# Patient Record
Sex: FEMALE | Race: BLACK OR AFRICAN AMERICAN | Employment: OTHER | URBAN - METROPOLITAN AREA
[De-identification: names, ages, dates, MRNs, and addresses within clinical notes are randomized per-mention and may not be internally consistent; named-entity substitution may affect disease eponyms.]

---

## 2017-11-18 ENCOUNTER — HOSPITAL ENCOUNTER (OUTPATIENT)
Dept: GENERAL RADIOLOGY | Age: 82
Discharge: HOME | End: 2017-11-18
Attending: PAIN MEDICINE
Payer: MEDICARE

## 2017-11-18 ENCOUNTER — HOSPITAL ENCOUNTER (OUTPATIENT)
Dept: MRI IMAGING | Age: 82
Discharge: HOME | End: 2017-11-18
Attending: PAIN MEDICINE
Payer: MEDICARE

## 2017-11-18 VITALS — BODY MASS INDEX: 24.22 KG/M2 | WEIGHT: 124 LBS

## 2017-11-18 DIAGNOSIS — M54.16 LUMBAR RADICULOPATHY: ICD-10-CM

## 2017-11-18 DIAGNOSIS — M48.07 LUMBOSACRAL STENOSIS: ICD-10-CM

## 2017-11-18 DIAGNOSIS — M46.1 SACROILIITIS, NOT ELSEWHERE CLASSIFIED (HCC): ICD-10-CM

## 2017-11-18 DIAGNOSIS — M47.817 LUMBOSACRAL SPONDYLOSIS WITHOUT MYELOPATHY: ICD-10-CM

## 2017-11-18 DIAGNOSIS — M96.1 POSTLAMINECTOMY SYNDROME, CERVICAL REGION: ICD-10-CM

## 2017-11-18 DIAGNOSIS — M54.17 LUMBOSACRAL NEURITIS: ICD-10-CM

## 2017-11-18 LAB — CREAT BLD-MCNC: 0.8 MG/DL (ref 0.6–1.3)

## 2017-11-18 PROCEDURE — A9576 INJ PROHANCE MULTIPACK: HCPCS | Performed by: PAIN MEDICINE

## 2017-11-18 PROCEDURE — 74011250636 HC RX REV CODE- 250/636: Performed by: PAIN MEDICINE

## 2017-11-18 PROCEDURE — 82565 ASSAY OF CREATININE: CPT

## 2017-11-18 PROCEDURE — 72114 X-RAY EXAM L-S SPINE BENDING: CPT

## 2017-11-18 PROCEDURE — 72158 MRI LUMBAR SPINE W/O & W/DYE: CPT

## 2017-11-18 RX ORDER — SODIUM CHLORIDE 0.9 % (FLUSH) 0.9 %
10 SYRINGE (ML) INJECTION
Status: COMPLETED | OUTPATIENT
Start: 2017-11-18 | End: 2017-11-18

## 2017-11-18 RX ADMIN — Medication 10 ML: at 15:40

## 2017-11-18 RX ADMIN — GADOTERIDOL 10 ML: 279.3 INJECTION, SOLUTION INTRAVENOUS at 15:39

## 2021-11-03 ENCOUNTER — TELEPHONE (OUTPATIENT)
Dept: PALLATIVE CARE | Age: 86
End: 2021-11-03

## 2021-11-03 NOTE — TELEPHONE ENCOUNTER
Pierre Vincent is calling to refer patient to Palliative , states referred by Dr. Martina lozoya/ Ramses Estevez. Referred reason:  Frequent falls, old age. Advised to call Isaac Rivers, daughter at [phone].

## 2021-11-03 NOTE — TELEPHONE ENCOUNTER
Home Based Primary Care & Supportive Services   Phone:  [phone]      Fax:  [phone] [facility] [address]    Name:  Ton Tsai  YOB: 1922    Received outpatient Palliative Medicine referral from Kenna carl at [facility] for Ton Tsai. Referral reason stated:  Frequent falls, old age. Contact is patient's daughter Delta Stuart  [phone]. This nurse called Delta Stuart, no answer, left message. This nurse called Kenna carl as Maria Eugenia Harley and explained that  patient's daughter calls back this nurse will assess for the patient's functional decline and other symptoms. The guidelines for our outpatient Specialty Palliative Medicine clinic are:  Patient is in the latter stages of a serious progressive illness, and in addition to limited life expectancy, the patient has the presence of 1 or more COPE needs (Care Decisions, Overwhelming Symptoms, Psychosocial Distress, or End-Stage Disease). This nurse also informed Kenna carl  that one of the two outpatient Palliative physicians has been on leave for 3 months. She returns in two weeks. For now, we are only accepting active cancer patients for outpatient Palliative appts. That will change sometime in the near future, but his nurse does not have a date yet. Addendum:  Patient's Delta Stuart returned call on 11/5/21. She is looking for Home Based Primary Care for her mother (not Palliative). See this nurse's note from 11/5/21.     Justen Ness, JUANN, RN-BC, [facility]  Referral Navigator, Home Based Primary Care & Supportive Services

## 2021-11-05 ENCOUNTER — TELEPHONE (OUTPATIENT)
Dept: FAMILY MEDICINE CLINIC | Age: 86
End: 2021-11-05

## 2021-11-05 NOTE — TELEPHONE ENCOUNTER
[address] [place] [address], [address] [facility] [address]  Phone:  [phone]        Fax:  [phone]    Patient:  Frederick Celestin  YOB: 1922    Incoming call from patient's daughter Micki Daly. She says Deanna Carrington recommended [facility] to her for her mother Frederick Celestin. Preliminary Intake Note:     Address:   [place] [address]    Does patient live within 10 miles of [address] [place] [address] at address listed above?      yes         Distance from [address] [place] [address]/Travel Time:  4.7 miles/ 14 minutes    Insurance Information:  [facility] Medicare Complete    Does patient qualify based on address and insurance? This nurse will call and check insurance      Region:  [place], near [address] [place] [address].    Referred By: Deanna Carrington    Reason for Referral:  Patient had appt with Deanna Carrington physician on 11/2/21. It was a taxing effort for daughter to get patient out to the appt. Diagnosis:  HTN, osteoarthritis, hx of breast cancer, GERD, anxiety    Last PCP visit:  11/2/21    Last Hospitalization:  None recent per daughter    Nursing Home/Rehab stay in the past year?  no    Homebound Assessment:  Tell me about the last time you left home, where did you go? To PCP office on 11/2/21  How long were you gone? About 2 hours  How did you get there?  vehicle  Who went with you?  daughter and her friend  How did you tolerate the outing? Not tolerated well, patient was weak and tired after the trip  How does your illness or condition impact your ability to leave home? Frail, frequent falls    Primary Caregiver:  Micki Daly  Relation to Patient:  daughter  Contact Information:  [phone]    Records Needed:  Yes, will need to request records from Deanna Carrington     Current Controlled Substances:  No    Next steps: This nurse will call and check insurance.                          If Dr. Mike Sullivan is in-network with patient's plan, will send Authorization to Release Health Information form to patient for signature so records can be requested from [facility].

## 2021-11-09 ENCOUNTER — TELEPHONE (OUTPATIENT)
Dept: FAMILY MEDICINE CLINIC | Age: 86
End: 2021-11-09

## 2021-11-09 NOTE — TELEPHONE ENCOUNTER
Called Rubio Lilly, patients daughter, to get updated insurance information for the patient. Left a VM at the home phone for Khang Espinosa to please call back the [address] [place] [address] office. Tried to call cell phone but not available for service.

## 2021-11-10 NOTE — TELEPHONE ENCOUNTER
Patient's daughter Ventura Dorantes returned call and gave this nurse insurance information-sj now has [employer]. PSR will check insurance authorization tomorrow.

## 2021-11-11 ENCOUNTER — TELEPHONE (OUTPATIENT)
Dept: FAMILY MEDICINE CLINIC | Age: 86
End: 2021-11-11

## 2021-11-11 NOTE — TELEPHONE ENCOUNTER
I called Yaneth Pak to verify the patients policy information. I spoke with Florence Pollock.  Of [facility] and he said that the patient is:    Fully funded  No Pior Auth needed  Must call member line to change PCP prior to 1st visit  Dr. Song Contreras is in network  $0 co-pay, $25 Specialist  $3450 limit out of pocket/$175 met  Codes approved: 42811,68376, 12506,90575  Call Reference #[id number]

## 2021-11-12 ENCOUNTER — TELEPHONE (OUTPATIENT)
Dept: FAMILY MEDICINE CLINIC | Age: 86
End: 2021-11-12

## 2021-11-12 NOTE — TELEPHONE ENCOUNTER
Martín Hull called to find out about the status of the patients acceptance into the [facility] program.  She can also be reached at [phone]

## 2021-11-15 NOTE — TELEPHONE ENCOUNTER
Home Based Primary Care & Supportive Services   Phone:  [phone]      Fax:  [phone] [address]    Name:  Nida Pena  YOB: 1922    Patient's request for [facility] services was discussed in weekly IDG on 11/11/21 and patient was accepted pending one clarification: [facility] note from 11/2/21 says patient's daughter would like patient to go to Assisted Living. This nurse called patient's daughter on 11/12/21 and left message requesting call back to clarify. Daughter Flaco Mosher returned call. She states that she would like to keep her mother at home and she would like [facility] services. At this time, Assisted Living would be a plan in the future if her mother's condition declines further. This nurse will inform [facility] team that patient is ready for scheduling.     JUAN WhiteN, RN-BC, [facility]  Referral Navigator, Home Based Primary Care & Supportive Services

## 2021-11-16 ENCOUNTER — TELEPHONE (OUTPATIENT)
Dept: FAMILY MEDICINE CLINIC | Age: 86
End: 2021-11-16

## 2021-11-16 NOTE — TELEPHONE ENCOUNTER
LCSW rec'd return call from pt's dtr, Roderick Jurado. She stated she is not available on Monday 11/22 for intake paperwork completion visit, is requesting Friday 11/19. LCSW to visit with pt and Roderick Jurado in their home on Friday 11/19 @ 4pm for initial psychosocial assessment and intake paperwork completion.     Oksana Ro, MEHRDAD, [facility]    [place] Based [address] [place] [address]  J) [phone]

## 2021-11-16 NOTE — TELEPHONE ENCOUNTER
LCSW called pt's dtr, Trevor, to coordinate a time on Monday 11/22 that LCSW can come by the home with [facility] intake paperwork for completion prior to Dr. Sonny Olea visit on Tuesday 11/23 for initial provider visit. No answer. LCSW left detailed message, provided contact information, and encouraged a call back at her convenience.      MEHRDAD Haas, [employer]    [place] Based [address] [place] [address]  (c) [phone]

## 2021-11-19 ENCOUNTER — HOME VISIT (OUTPATIENT)
Dept: FAMILY MEDICINE CLINIC | Age: 86
End: 2021-11-19

## 2021-11-20 NOTE — PROGRESS NOTES
Home Based Primary Care   [phone]  Initial Social Work Assessment    Date and Time of Initial In-Home Visit: 11/19/21    Reason for Assessment: [facility] intake paperwork completion    Sources of Information: Pt's dtr, Radha Peralta    SOCIAL HISTORY  Relationship Status:   [] Single  []   [] Significant Other  []   [x] /  [] Other:     Living Circumstances:  Pt lives at home with her daughter, Manuel Mae    Current/Type of Housing:  [] Public/subsidized housing  [] Apartment, Is there an elevator:   [] Assisted Living  [x] [employer], How many floors: 2    FINANCIAL/INSURANCE  Income per month: $1,100/month  Source of Income:    [x] Social Security: $1,000/month   [] SSI   [x] Pension: $100/month   [] Employment Income   [] [address]:   [x] Medicare   [] Medicaid   [x] Private Insurance: [employer]   [] Other:     Are you having trouble paying for things like rent, food, medications? Not at this time    Is there an agency or person who pays your bills? If yes, who? Dtr, Manuel Mae, manages most household and personal tasks for pt    [address]: No concerns    Problem with bed bugs, odors, pests, or pets? Not at this time    Working smoke alarm? [x] Yes [] No    Difficulty getting to exits from the home? There are steps to the front door, a ground-level exit at the side of the home    Firearm Assessment:   Are there firearms in the home? [] Yes, Where are they kept? [x] No      SOCIAL SUPPORT ENVIRONMENT  Support System: Pt's dtr, Manuel Mae, is primary support. Manuel Mae is an only child. Pt has siblings. Closest sibling lives off [facility], is currently on hospice    How often do social supports visit? Pt lives with Manuel Mae in their home that they share together    How do you socialize? When people come to visit    Are you involved with any community agencies? Name/Contact: Bob Almendarez was pt's former primary care.  She had a liaison visit from [address] still keeps in touch with their , Rayshawn Ghotra. LCSW spoke with Rayshawn Ghotra on the phone while at the home on Friday. Is or has Adult Protective Services ever been involved? No    In the last 6 months, have you seen a ? Psychiatrist? If yes, they be contacted by [facility] team? Yes, Rayshawn [facility]hotra at [facility]    Substance Use:   Tobacco? [] Yes [x] No    Alcohol? [] Yes, How many drinks per week: [x] No   Drugs? [] Yes, which drugs:   [x] No    Do you have an Emergency Call Device system? [] Yes, Which brand? [x] No, Are you interested in obtaining and subscribing to an Emergency Call Device system? Keith Riggins states she is considering looking in to seeing if [employer] will cover an emergency call device. Minnieroxanne Riggins has her own necklace button that she said was covered by her own insurance. She states that mom is rarely left home alone. COGNITIVE/EMOTIONAL   Mental Status: Pt was awake, ambulating using walker in the back half of the house. She had been sleeping prior to LCSW visit. Dtr, Keith Riggins, states that mom's memory is mediocre, she has some difficulty remembering things or understanding what is being said at times. LCSW spoke with pt briefly. She was able to answer questions appropriately, but did not appear to wish to engage in longer conversation. She was comfortable with dtr, Keith Riggins, signing intake paperwork on her behalf. Keith Riggins stated she did not believe that mom would completely understand the documents she was signing. How is your memory? Dtr states there are memory concerns    In the last 6 months, has anyone told you that you are forgetful? Yes    Do you receive help with ADLs? [x] Yes, Who helps you? How many hours/days? Dtr, Keith Riggins, is primary caregiver. Ketih Riggins has many health concerns of her own. LCSW assessed for ability to hire an aide. Keith Riggins stated that they had tried this in the past, and mom refused to let them come back.  Mom is a \"very private\" person and is not comfortable with anyone except Jim Arnold providing personal care. [] No, Do you need help? TRANSPORTATION NEEDS ASSESSMENT  Can you use public transportation? [] Yes [x] No  Do you use transportation services provided by: Managed Care Organization? Community Service Resource? No    EMERGENCY ACTION PLAN  SW reviewed the Emergency Action Plan with pt and/or family during this initial in-home Social Work assessment. SW completed Emergency Numbers, reviewed the content areas of Power Loss, Natural Disasters, Clinical Emergencies, Severe Weather, Safety in the Home, and Infection Control. Patient's acuity value consider to be LOW. [address] stated that mom has a completed Power of  document \"somwhere in the house\". The house was fairly cluttered, and Jim Clayton said she would have to search for it. LCSW encouraged her to try to find the Power of  documents, and provide a copy to the [facility] team at a later date. Narrative:  SW visited with pt and dtr, Jim Arnold, in the home. New patient assessment of psychosocial needs and social determinants of health completed. SW introduced Home Based Primary Care and Supportive Services, and reviewed Emergency Action Plan booklet. Pt was initially asleep in the back bedroom of the home when LCSW arrived. Dtr. Jim Arnold, came to the door and it was apparent that she has health concerns of her own. She disclosed that she cannot drive due to having seizures and history of strokes. If transportation is needed, Jim Arnold calls her cousin who lives nearby and helps take Jim Arnold and pt to appointments. Pt has been going to [facility] for her primary care. Jim Arnold expressed dissatisfaction with [facility] and is looking forward to enrolling mom in [facility]. Jim Arnold stated that mom had an intake assessment from [facility] recently, but pt and dtr were informed that mom did not meet hospice criteria at that time.  Jim Arnold has been in touch with their , Brendan Claude ([phone]). Conrad Raya was initially confused as to what [facility] was, thinking that [facility] was hospice. LCSW provided education. She requested that LCSW talk with Brendan Claude. She called Brendan Claude on her cell phone. LCSW talked to Brendan Claude, explained what services [facility] provides. Brendan Claude stated that she thinks pt will benefit from [facility] considerably, and that it was the best option until pt meets hospice eligibility criteria. Mirza Claude requested LCSW's contact information, so she can learn more about [facility]. LCSW provided contact information. LCSW assessed caregiver concerns. Conrad Raya is pt's only caregiver, but Conrad Raya appears frail and has health concerns of her own. LCSW asked about feasibility of hiring an aide for an out of pocket cost, as pt does not have Medicaid. Conrad Raya stated that they have tried this in the past (hiring private duty aides), but mom ends up refusing to allow them to come back because she is \"very private\" and is not comfortable with anyone except Conrad Aquinos providing personal care. LCSW asked Conrad Raya if they have ever applied for Medicaid. She states that mom's monthly income is roughly $1,100/month. LCSW explained that this pay put her in the eligibility criteria range for Medicaid. LCSW provided Conrad Raya with the phone number to call to apply for Medicaid [phone] since she does not have a computer or ability to apply online. She said she will call to apply either later this evening (the intake line is open until 7pm) or tomorrow since they have Saturday hours. She expressed appreciation for the information today. In the meantime, [facility] provided a list of local home care agencies that could provide personal care aides, if the responsibility of caring for mom becomes a danger to Conrad Raya due to her own health concerns.     Plan:   [x] Establish therapeutic relationship through in home visits and telephonic touch points  [] Assist in optimizing levels of psychosocial function  [x] Assess safety concerns and address as appropriate  [x] Assess for caregiver burden and intervene as psychosocially indicated  [x] Assess patient for caregiver needs to optimize psychosocial function and safety  [x] Provide information on available community resources  [] Initiate conversation regarding health care wishes   [] Assess current Advance Care Planning documents and make ACP recommendations as appropriate  [] Discuss goals of care and treatment preferences  [] Screen for mental health conditions including but not limited to anxiety, depression, and anticipatory grief  [] Offer counseling services for mental health conditions including but not limited to anxiety, depression, and anticipatory grief  [] Engage family in patient health care goals to ensure support for those goals and minimize patient/family conflict  [] Assess cultural needs of patient/family, educate interdisciplinary team and engage appropriate resources    Frequency of Social Work Follow-Up/Visits  [] As needed  [] Bi-monthly  [x] Monthly  [] Weekly  [] Other:    MEHRDAD Brenner, [address] Worker  Home Based [address] [place] [address]  [phone]

## 2021-11-20 NOTE — ACP (ADVANCE CARE PLANNING)
Pt's dtr, Oswald Peters, stated that mom has medical Power of [employer] documents, but they were not accessible during LCSW's initial in-home assessment. LCSW encouraged her to find the document and provide to [facility] team, to be scanned in to mom's medical record.

## 2021-11-23 ENCOUNTER — HOME VISIT (OUTPATIENT)
Dept: FAMILY MEDICINE CLINIC | Age: 86
End: 2021-11-23
Payer: MEDICARE

## 2021-11-23 DIAGNOSIS — I27.20 PULMONARY HYPERTENSION (HCC): ICD-10-CM

## 2021-11-23 DIAGNOSIS — M19.90 OSTEOARTHRITIS, UNSPECIFIED OSTEOARTHRITIS TYPE, UNSPECIFIED SITE: ICD-10-CM

## 2021-11-23 DIAGNOSIS — Z87.898 HISTORY OF BENZODIAZEPINE USE: ICD-10-CM

## 2021-11-23 DIAGNOSIS — Z74.09 IMPAIRED MOBILITY: ICD-10-CM

## 2021-11-23 DIAGNOSIS — J44.9 CHRONIC OBSTRUCTIVE PULMONARY DISEASE, UNSPECIFIED COPD TYPE (HCC): ICD-10-CM

## 2021-11-23 DIAGNOSIS — Z91.81 AT HIGH RISK FOR FALLS: ICD-10-CM

## 2021-11-23 DIAGNOSIS — I10 ESSENTIAL HYPERTENSION: Primary | ICD-10-CM

## 2021-11-23 DIAGNOSIS — F32.5 MAJOR DEPRESSIVE DISORDER WITH SINGLE EPISODE, IN REMISSION (HCC): ICD-10-CM

## 2021-11-23 DIAGNOSIS — I50.32 CHRONIC HEART FAILURE WITH PRESERVED EJECTION FRACTION (HCC): ICD-10-CM

## 2021-11-23 DIAGNOSIS — Z76.89 ESTABLISHING CARE WITH NEW DOCTOR, ENCOUNTER FOR: ICD-10-CM

## 2021-11-23 PROCEDURE — 1101F PT FALLS ASSESS-DOCD LE1/YR: CPT | Performed by: FAMILY MEDICINE

## 2021-11-23 PROCEDURE — 1090F PRES/ABSN URINE INCON ASSESS: CPT | Performed by: FAMILY MEDICINE

## 2021-11-23 PROCEDURE — G8536 NO DOC ELDER MAL SCRN: HCPCS | Performed by: FAMILY MEDICINE

## 2021-11-23 PROCEDURE — G8427 DOCREV CUR MEDS BY ELIG CLIN: HCPCS | Performed by: FAMILY MEDICINE

## 2021-11-23 PROCEDURE — 99343 PR HOME VST NEW PATIENT MOD-HI SEVERITY 45 MINUTES: CPT | Performed by: FAMILY MEDICINE

## 2021-11-26 ENCOUNTER — APPOINTMENT (OUTPATIENT)
Dept: GENERAL RADIOLOGY | Age: 86
End: 2021-11-26
Attending: EMERGENCY MEDICINE
Payer: MEDICARE

## 2021-11-26 ENCOUNTER — APPOINTMENT (OUTPATIENT)
Dept: CT IMAGING | Age: 86
End: 2021-11-26
Attending: EMERGENCY MEDICINE
Payer: MEDICARE

## 2021-11-26 ENCOUNTER — HOSPITAL ENCOUNTER (EMERGENCY)
Age: 86
Discharge: HOME | End: 2021-11-27
Attending: EMERGENCY MEDICINE
Payer: MEDICARE
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
## 2021-11-26 PROCEDURE — 70450 CT HEAD/BRAIN W/O DYE: CPT

## 2021-11-26 PROCEDURE — 71045 X-RAY EXAM CHEST 1 VIEW: CPT

## 2021-11-26 PROCEDURE — 99285 EMERGENCY DEPT VISIT HI MDM: CPT

## 2021-11-26 PROCEDURE — 72125 CT NECK SPINE W/O DYE: CPT

## 2021-11-27 VITALS
HEIGHT: 64 IN | SYSTOLIC BLOOD PRESSURE: 112 MMHG | HEART RATE: 76 BPM | RESPIRATION RATE: 16 BRPM | WEIGHT: 98 LBS | DIASTOLIC BLOOD PRESSURE: 65 MMHG | OXYGEN SATURATION: 97 % | TEMPERATURE: 97.6 F | BODY MASS INDEX: 16.73 KG/M2

## 2021-11-27 LAB
ALBUMIN SERPL-MCNC: 3 G/DL (ref 3.5–5)
ALBUMIN/GLOB SERPL: 0.6 {RATIO} (ref 1.1–2.2)
ALP SERPL-CCNC: 92 U/L (ref 45–117)
ALT SERPL-CCNC: 13 U/L (ref 12–78)
ANION GAP SERPL CALC-SCNC: 7 MMOL/L (ref 5–15)
APPEARANCE UR: CLEAR
AST SERPL-CCNC: 22 U/L (ref 15–37)
ATRIAL RATE: 315 BPM
BACTERIA URNS QL MICRO: NEGATIVE /HPF
BASOPHILS # BLD: 0.1 K/UL (ref 0–0.1)
BASOPHILS NFR BLD: 1 % (ref 0–1)
BILIRUB SERPL-MCNC: 0.6 MG/DL (ref 0.2–1)
BILIRUB UR QL: NEGATIVE
BUN SERPL-MCNC: 20 MG/DL (ref 6–20)
BUN/CREAT SERPL: 24 (ref 12–20)
CALCIUM SERPL-MCNC: 9.6 MG/DL (ref 8.5–10.1)
CALCULATED P AXIS, ECG09: 18 DEGREES
CALCULATED R AXIS, ECG10: 7 DEGREES
CALCULATED T AXIS, ECG11: 47 DEGREES
CHLORIDE SERPL-SCNC: 102 MMOL/L (ref 97–108)
CO2 SERPL-SCNC: 26 MMOL/L (ref 21–32)
COLOR UR: NORMAL
COMMENT, HOLDF: NORMAL
CREAT SERPL-MCNC: 0.83 MG/DL (ref 0.55–1.02)
DIAGNOSIS, 93000: NORMAL
DIFFERENTIAL METHOD BLD: ABNORMAL
EOSINOPHIL # BLD: 0 K/UL (ref 0–0.4)
EOSINOPHIL NFR BLD: 0 % (ref 0–7)
EPITH CASTS URNS QL MICRO: NORMAL /LPF
ERYTHROCYTE [DISTWIDTH] IN BLOOD BY AUTOMATED COUNT: 12.7 % (ref 11.5–14.5)
GLOBULIN SER CALC-MCNC: 5 G/DL (ref 2–4)
GLUCOSE SERPL-MCNC: 124 MG/DL (ref 65–100)
GLUCOSE UR STRIP.AUTO-MCNC: NEGATIVE MG/DL
HCT VFR BLD AUTO: 36.3 % (ref 35–47)
HGB BLD-MCNC: 11.6 G/DL (ref 11.5–16)
HGB UR QL STRIP: NEGATIVE
HYALINE CASTS URNS QL MICRO: NORMAL /LPF (ref 0–5)
IMM GRANULOCYTES # BLD AUTO: 0.1 K/UL (ref 0–0.04)
IMM GRANULOCYTES NFR BLD AUTO: 1 % (ref 0–0.5)
KETONES UR QL STRIP.AUTO: NEGATIVE MG/DL
LEUKOCYTE ESTERASE UR QL STRIP.AUTO: NEGATIVE
LYMPHOCYTES # BLD: 0.4 K/UL (ref 0.8–3.5)
LYMPHOCYTES NFR BLD: 4 % (ref 12–49)
MCH RBC QN AUTO: 32.5 PG (ref 26–34)
MCHC RBC AUTO-ENTMCNC: 32 G/DL (ref 30–36.5)
MCV RBC AUTO: 101.7 FL (ref 80–99)
MONOCYTES # BLD: 0.8 K/UL (ref 0–1)
MONOCYTES NFR BLD: 7 % (ref 5–13)
NEUTS SEG # BLD: 9.5 K/UL (ref 1.8–8)
NEUTS SEG NFR BLD: 87 % (ref 32–75)
NITRITE UR QL STRIP.AUTO: NEGATIVE
NRBC # BLD: 0 K/UL (ref 0–0.01)
NRBC BLD-RTO: 0 PER 100 WBC
PH UR STRIP: 7.5 [PH] (ref 5–8)
PLATELET # BLD AUTO: 282 K/UL (ref 150–400)
PMV BLD AUTO: 10 FL (ref 8.9–12.9)
POTASSIUM SERPL-SCNC: 4.4 MMOL/L (ref 3.5–5.1)
PROT SERPL-MCNC: 8 G/DL (ref 6.4–8.2)
PROT UR STRIP-MCNC: NEGATIVE MG/DL
Q-T INTERVAL, ECG07: 390 MS
QRS DURATION, ECG06: 72 MS
QTC CALCULATION (BEZET), ECG08: 455 MS
RBC # BLD AUTO: 3.57 M/UL (ref 3.8–5.2)
RBC #/AREA URNS HPF: NORMAL /HPF (ref 0–5)
RBC MORPH BLD: ABNORMAL
SAMPLES BEING HELD,HOLD: NORMAL
SODIUM SERPL-SCNC: 135 MMOL/L (ref 136–145)
SP GR UR REFRACTOMETRY: 1.02 (ref 1–1.03)
TROPONIN-HIGH SENSITIVITY: 20 NG/L (ref 0–51)
TROPONIN-HIGH SENSITIVITY: 22 NG/L (ref 0–51)
UR CULT HOLD, URHOLD: NORMAL
UROBILINOGEN UR QL STRIP.AUTO: 1 EU/DL (ref 0.2–1)
VENTRICULAR RATE, ECG03: 82 BPM
WBC # BLD AUTO: 10.9 K/UL (ref 3.6–11)
WBC MORPH BLD: ABNORMAL
WBC URNS QL MICRO: NORMAL /HPF (ref 0–4)

## 2021-11-27 PROCEDURE — 74011250637 HC RX REV CODE- 250/637: Performed by: EMERGENCY MEDICINE

## 2021-11-27 PROCEDURE — 93005 ELECTROCARDIOGRAM TRACING: CPT

## 2021-11-27 PROCEDURE — 36415 COLL VENOUS BLD VENIPUNCTURE: CPT

## 2021-11-27 PROCEDURE — 81001 URINALYSIS AUTO W/SCOPE: CPT

## 2021-11-27 PROCEDURE — 84484 ASSAY OF TROPONIN QUANT: CPT

## 2021-11-27 PROCEDURE — 85025 COMPLETE CBC W/AUTO DIFF WBC: CPT

## 2021-11-27 PROCEDURE — 80053 COMPREHEN METABOLIC PANEL: CPT

## 2021-11-27 RX ORDER — ACETAMINOPHEN 500 MG
500 TABLET ORAL
Status: COMPLETED | OUTPATIENT
Start: 2021-11-27 | End: 2021-11-27

## 2021-11-27 RX ADMIN — ACETAMINOPHEN 500 MG: 500 TABLET ORAL at 03:04

## 2021-11-27 NOTE — ED TRIAGE NOTES
Patient arrives from home where she lives with her daughter. Accoriding to EMS she had a GLF. She denies LOC. Denies taking blood thinners. Complains of pain in her spine and her left arm.

## 2021-11-27 NOTE — ED NOTES
Called daughter to get an ETA for arrival and she did not answer at this time. Both home and cell phone numbers were called. Cell phone seems to be disconnected.

## 2021-11-27 NOTE — ED NOTES
Nelda Hernández will return call with transportation information. They will provide door to door assistance so the patient can get into her home safely.      Transportation number [id number]

## 2021-11-27 NOTE — ED NOTES
Spoke to patient's daughter. Daughter does not currently have a plan in place to transport the patient home and there is concern for getting the patient up the steps to their home. Will call [employer].

## 2021-11-27 NOTE — ED NOTES
Verbal shift change report given to MikeRN (oncoming nurse) by Ilia Oneal RN (offgoing nurse). Report included the following information SBAR, ED Summary, MAR and Recent Results.

## 2021-11-27 NOTE — DISCHARGE INSTRUCTIONS
Work up in the emergency department was reassuring tonight including xrays, blood work and urine. Follow up with your primary care doctor this week.  Return to the ED if you feel your symptoms/condition worsening

## 2021-11-27 NOTE — ED NOTES
MD  reviewed discharge instructions and options with patient. Patient verbalized understanding. RN reviewed discharge instructions using teach back method. Patient ambulatory to exit without difficulty and no acute signs of distress. No complaints or needs expressed at this time. Patient counseled on medications prescribed at discharge. Vital signs stable. Patient to follow up with PCP in the morning for appointment. Charge stated patient can remain in her room in ED until the daughter can pick her up. The patient's daughter will pick her up once she has available resources to assist in getting the patient back home safely.

## 2021-11-27 NOTE — ED NOTES
Pt assisted into cab transport home. Jaun Sweet RN spoke with pt's daughter who is expecting pt and will assist pt into house with pt's personal cane. Discharge paperwork given to . Pt condition stable.

## 2021-11-27 NOTE — ED PROVIDER NOTES
HPI 80 old female with a history of arthritis, breast cancer, acid reflux, hypertension, high cholesterol, anxiety, presents the emergency department after a fall. Patient does not think that she fell. She is not really sure why she is here. She denies a headache but reports neck pain. She denies of fever chest pain cough shortness of breath nausea vomiting. States she is eating okay. She knows that yesterday was Thanksgiving. Per daughter- sitting in chair in the kitchen, up all night, sleeps all day. This is her normal routine Has arthritis in spine and arm. PCP visit on Tuesday. Got her up, she is stubborn, just took her bp medications. Habit of sitting edge of chair, slid off the chair. No loss of consciousness. Sat herself up on the floor. She was not  pale/sweaty/labored breathing while on the floor.  Recently off antibiotics 2 weeks ago for UTI    Past Medical History:   Diagnosis Date    Arthritis     knees and back    Cancer [facility]) 1956    breast     GERD (gastroesophageal reflux disease)     Hypertension     Ill-defined condition     eczema    Ill-defined condition     high cholesterol    Psychiatric disorder     anxiety       Past Surgical History:   Procedure Laterality Date    HX APPENDECTOMY      HX CATARACT REMOVAL Bilateral     HX HYSTERECTOMY  1968    HX MASTECTOMY Left 1956    lypmph node removal    HX MASTECTOMY Right 1994    HX ORTHOPAEDIC Left     arthoscopic knee surgery    HX ORTHOPAEDIC  1980    ankle surgery    HX ORTHOPAEDIC  4/20/15    Left unicompartmental knee replacement, Right knee injection    HX UROLOGICAL  2014/11    prolapsed bladder    NEUROLOGICAL PROCEDURE UNLISTED      back surgery from spinal stenosis         Family History:   Problem Relation Age of Onset    Heart Attack Mother     Stroke Father        Social History     Socioeconomic History    Marital status:      Spouse name: Not on file    Number of children: Not on file    Years of education: Not on file    Highest education level: Not on file   Occupational History    Not on file   Tobacco Use    Smoking status: Never Smoker    Smokeless tobacco: Never Used   Substance and Sexual Activity    Alcohol use: Yes     Alcohol/week: 0.8 standard drinks     Types: 1 drink(s) per week     Comment: rare    Drug use: No    Sexual activity: Not on file   Other Topics Concern    Not on file   Social History Narrative    Not on file     Social Determinants of Health     Financial Resource Strain:     Difficulty of Paying Living Expenses: Not on file   Food Insecurity:     Worried About Running Out of Food in the Last Year: Not on file    Jose of Food in the Last Year: Not on file   Transportation Needs:     Lack of Transportation (Medical): Not on file    Lack of Transportation (Non-Medical): Not on file   Physical Activity:     Days of Exercise per Week: Not on file    Minutes of Exercise per Session: Not on file   Stress:     Feeling of Stress : Not on file   Social Connections:     Frequency of Communication with Friends and Family: Not on file    Frequency of Social Gatherings with Friends and Family: Not on file    Attends [employer] Services: Not on file    Active Member of [address] [place] [address] [employer] or Organizations: Not on file    Attends Club or Organization Meetings: Not on file    Marital Status: Not on file   Intimate Partner Violence:     Fear of Current or Ex-Partner: Not on file    Emotionally Abused: Not on file    Physically Abused: Not on file    Sexually Abused: Not on file   Housing Stability:     Unable to Pay for Housing in the Last Year: Not on file    Number of [place] in the Last Year: Not on file    Unstable Housing in the Last Year: Not on file         ALLERGIES: Patient has no known allergies. Review of Systems   Constitutional: Negative for fever. HENT: Negative for congestion. Eyes: Negative for visual disturbance.    Respiratory: Negative for cough and shortness of breath. Cardiovascular: Negative for chest pain. Gastrointestinal: Negative for abdominal pain, nausea and vomiting. Endocrine: Negative for polyuria. Genitourinary: Negative for dysuria. Musculoskeletal: Positive for neck pain. Negative for gait problem. Skin: Negative for rash and wound. Neurological: Negative for headaches. Psychiatric/Behavioral: Negative for dysphoric mood. Vitals:    11/26/21 2259   BP: (!) 170/85   Pulse: 77   Resp: 22   Temp: 97 °F (36.1 °C)   SpO2: 99%   Weight: 44.5 kg (98 lb)   Height: 5' 4\" (1.626 m)            Physical Exam  Constitutional:       General: She is not in acute distress. Appearance: She is well-developed. HENT:      Head: Normocephalic and atraumatic. Mouth/Throat:      Pharynx: No oropharyngeal exudate. Eyes:      General: No scleral icterus. Right eye: No discharge. Left eye: No discharge. Pupils: Pupils are equal, round, and reactive to light. Neck:      Vascular: No JVD. Cardiovascular:      Rate and Rhythm: Normal rate and regular rhythm. Heart sounds: Normal heart sounds. No murmur heard. Pulmonary:      Effort: Pulmonary effort is normal. No respiratory distress. Breath sounds: Normal breath sounds. No stridor. No wheezing or rales. Chest:      Chest wall: No tenderness. Abdominal:      General: Bowel sounds are normal. There is no distension. Palpations: Abdomen is soft. There is no mass. Tenderness: There is no abdominal tenderness. There is no guarding or rebound. Musculoskeletal:         General: Normal range of motion. Cervical back: Normal range of motion and neck supple. Skin:     General: Skin is warm and dry. Capillary Refill: Capillary refill takes less than 2 seconds. Findings: No rash. Neurological:      Mental Status: She is oriented to person, place, and time.    Psychiatric:         Behavior: Behavior normal.         Thought Content: Thought content normal.         Judgment: Judgment normal.          MDM       Procedures      ED EKG interpretation:  Rhythm: normal sinus rhythm; and regular . Rate (approx.): 82; Axis: normal; P wave: normal; QRS interval: normal ; ST/T wave: non-specific changes; . This EKG was interpreted by Mariah Troy MD,ED Provider. X-rays and lab work, urine are all reassuring including serial troponins. I spoke with the daughter, she is unable to come pick her up because she does not drive at night. We will arrange to transfer the patient back home in the morning.

## 2021-11-27 NOTE — ED NOTES
Emergency Room Nursing Communication Tool        Verbal shift change report given to Flory Bush, RN (incoming nurse) by Edvin Montelongo, RN (outgoing nurse) on Jocelin Jorge a 80 y.o. female and born 10/17/1922 who arrived at the hospital on 11/26/2021 10:52 PM. Report included the following information SBAR, Kardex, [facility] and Recent Results. Significant changes during shift: Patient to be picked up by MARK Mejia 15 min. Issues for physician to address:             Code Status: No Order     Admit Diagnosis: No admission diagnoses are documented for this encounter. Surgery: * No surgery found *     Infections: No current active infections     Allergies: Patient has no known allergies.      Current diet: No diet orders on file     Lines:               Vital Signs:   Patient Vitals for the past 12 hrs:   Temp Pulse Resp BP SpO2   11/27/21 0455 98.4 °F (36.9 °C) 77 20 113/65 99 %   11/27/21 0355    (!) 110/58 95 %   11/27/21 0340    (!) 122/58 94 %   11/27/21 0310    117/75    11/27/21 0240    (!) 141/77 97 %   11/27/21 0210     95 %   11/27/21 0210    122/65 98 %   11/27/21 0155    111/67 100 %   11/27/21 0125    (!) 127/95 100 %   11/27/21 0110    128/69 95 %   11/26/21 2310    (!) 171/85    11/26/21 2259 97 °F (36.1 °C) 77 22 (!) 170/85 99 %      Intake & Output:       Intake/Output Summary (Last 24 hours) at 11/27/2021 0757  Last data filed at 11/27/2021 0152  Gross per 24 hour   Intake    Output 140 ml   Net -140 ml      Laboratory Results:     Recent Results (from the past 12 hour(s))   CBC WITH AUTOMATED DIFF    Collection Time: 11/27/21 12:32 AM   Result Value Ref Range    WBC 10.9 3.6 - 11.0 K/uL    RBC 3.57 (L) 3.80 - 5.20 M/uL    HGB 11.6 11.5 - 16.0 g/dL    HCT 36.3 35.0 - 47.0 %    .7 (H) 80.0 - 99.0 FL    MCH 32.5 26.0 - 34.0 PG    MCHC 32.0 30.0 - 36.5 g/dL    RDW 12.7 11.5 - 14.5 %    PLATELET 468 778 - 689 K/uL    MPV 10.0 8.9 - 12.9 FL    NRBC 0.0 0  WBC    ABSOLUTE NRBC 0.00 0.00 - 0.01 K/uL    NEUTROPHILS 87 (H) 32 - 75 %    LYMPHOCYTES 4 (L) 12 - 49 %    MONOCYTES 7 5 - 13 %    EOSINOPHILS 0 0 - 7 %    BASOPHILS 1 0 - 1 %    IMMATURE GRANULOCYTES 1 (H) 0.0 - 0.5 %    ABS. NEUTROPHILS 9.5 (H) 1.8 - 8.0 K/UL    ABS. LYMPHOCYTES 0.4 (L) 0.8 - 3.5 K/UL    ABS. MONOCYTES 0.8 0.0 - 1.0 K/UL    ABS. EOSINOPHILS 0.0 0.0 - 0.4 K/UL    ABS. BASOPHILS 0.1 0.0 - 0.1 K/UL    ABS. IMM. GRANS. 0.1 (H) 0.00 - 0.04 K/UL    DF SMEAR SCANNED      RBC COMMENTS MACROCYTOSIS  1+        WBC COMMENTS REACTIVE LYMPHS     METABOLIC PANEL, COMPREHENSIVE    Collection Time: 11/27/21 12:32 AM   Result Value Ref Range    Sodium 135 (L) 136 - 145 mmol/L    Potassium 4.4 3.5 - 5.1 mmol/L    Chloride 102 97 - 108 mmol/L    CO2 26 21 - 32 mmol/L    Anion gap 7 5 - 15 mmol/L    Glucose 124 (H) 65 - 100 mg/dL    BUN 20 6 - 20 MG/DL    Creatinine 0.83 0.55 - 1.02 MG/DL    BUN/Creatinine ratio 24 (H) 12 - 20      GFR est AA >60 >60 ml/min/1.73m2    GFR est non-AA >60 >60 ml/min/1.73m2    Calcium 9.6 8.5 - 10.1 MG/DL    Bilirubin, total 0.6 0.2 - 1.0 MG/DL    ALT (SGPT) 13 12 - 78 U/L    AST (SGOT) 22 15 - 37 U/L    Alk. phosphatase 92 45 - 117 U/L    Protein, total 8.0 6.4 - 8.2 g/dL    Albumin 3.0 (L) 3.5 - 5.0 g/dL    Globulin 5.0 (H) 2.0 - 4.0 g/dL    A-G Ratio 0.6 (L) 1.1 - 2.2     TROPONIN-HIGH SENSITIVITY    Collection Time: 11/27/21 12:32 AM   Result Value Ref Range    Troponin-High Sensitivity 22 0 - 51 ng/L   SAMPLES BEING HELD    Collection Time: 11/27/21 12:32 AM   Result Value Ref Range    SAMPLES BEING HELD 1RED 1blu     COMMENT        Add-on orders for these samples will be processed based on acceptable specimen integrity and analyte stability, which may vary by analyte.    EKG, 12 LEAD, INITIAL    Collection Time: 11/27/21 12:34 AM   Result Value Ref Range    Ventricular Rate 88 BPM    QRS Duration 68 ms    Q-T Interval 376 ms    QTC Calculation (Bezet) 454 ms    Calculated R Axis -7 degrees    Calculated T Axis -79 degrees    Diagnosis       Atrial fibrillation  ST & T wave abnormality, consider inferior ischemia  ST & T wave abnormality, consider anterior ischemia  Abnormal ECG  When compared with ECG of 08-APR-2015 13:29,  Atrial fibrillation has replaced Sinus rhythm  Inverted T waves have replaced nonspecific T wave abnormality in Inferior   leads  Inverted T waves have replaced nonspecific T wave abnormality in Anterior   leads     URINALYSIS W/MICROSCOPIC    Collection Time: 11/27/21  1:54 AM   Result Value Ref Range    Color YELLOW/STRAW      Appearance CLEAR CLEAR      Specific gravity 1.016 1.003 - 1.030      pH (UA) 7.5 5.0 - 8.0      Protein Negative NEG mg/dL    Glucose Negative NEG mg/dL    Ketone Negative NEG mg/dL    Bilirubin Negative NEG      Blood Negative NEG      Urobilinogen 1.0 0.2 - 1.0 EU/dL    Nitrites Negative NEG      Leukocyte Esterase Negative NEG      WBC 0-4 0 - 4 /hpf    RBC 0-5 0 - 5 /hpf    Epithelial cells FEW FEW /lpf    Bacteria Negative NEG /hpf    Hyaline cast 0-2 0 - 5 /lpf   URINE CULTURE HOLD SAMPLE    Collection Time: 11/27/21  1:54 AM    Specimen: Serum; Urine   Result Value Ref Range    Urine culture hold        Urine on hold in Microbiology dept for 2 days. If unpreserved urine is submitted, it cannot be used for addtional testing after 24 hours, recollection will be required. TROPONIN-HIGH SENSITIVITY    Collection Time: 11/27/21  3:00 AM   Result Value Ref Range    Troponin-High Sensitivity 20 0 - 51 ng/L            Opportunity for questions and clarifications were given to the incoming nurse. Patient's bed locked and is in low position, side rails up x2, door open PRN, call bell within reach of patient and patient not in distress.       Signed by: Deja Ha, RN, BSN, [address] [place] [address] Drive                       11/27/2021 at 7:57 AM

## 2021-11-29 ENCOUNTER — TELEPHONE (OUTPATIENT)
Dept: PALLATIVE CARE | Age: 86
End: 2021-11-29

## 2021-11-29 VITALS
OXYGEN SATURATION: 98 % | SYSTOLIC BLOOD PRESSURE: 152 MMHG | RESPIRATION RATE: 14 BRPM | HEART RATE: 78 BPM | DIASTOLIC BLOOD PRESSURE: 88 MMHG

## 2021-11-29 PROBLEM — F32.5 MAJOR DEPRESSIVE DISORDER WITH SINGLE EPISODE, IN REMISSION (HCC): Status: ACTIVE | Noted: 2021-11-29

## 2021-11-29 PROBLEM — M19.90 ARTHRITIS: Status: ACTIVE | Noted: 2021-11-29

## 2021-11-29 PROBLEM — I10 ESSENTIAL HYPERTENSION: Status: ACTIVE | Noted: 2021-11-29

## 2021-11-29 RX ORDER — HYDROCHLOROTHIAZIDE 25 MG/1
25 TABLET ORAL DAILY
COMMUNITY
End: 2022-01-03 | Stop reason: SDUPTHER

## 2021-11-29 RX ORDER — SERTRALINE HYDROCHLORIDE 100 MG/1
100 TABLET, FILM COATED ORAL DAILY
COMMUNITY
End: 2022-02-05 | Stop reason: SDUPTHER

## 2021-11-29 RX ORDER — DICLOFENAC SODIUM 10 MG/G
GEL TOPICAL 4 TIMES DAILY
Qty: 1 EACH | Refills: 2 | Status: SHIPPED | OUTPATIENT
Start: 2021-11-29

## 2021-11-29 RX ORDER — LISINOPRIL 40 MG/1
40 TABLET ORAL DAILY
COMMUNITY
End: 2022-02-07 | Stop reason: SDUPTHER

## 2021-11-29 RX ORDER — VIT A/VIT C/VIT E/ZINC/COPPER 4296-226
1 CAPSULE ORAL DAILY
COMMUNITY

## 2021-11-29 NOTE — TELEPHONE ENCOUNTER
Margaretsoren Doris is calling to let team know that she had to call 911 for patient. States she is not sure what happened but thinks her blood pressure dropped to low. States patient is at [facility] [place] in  Patient.

## 2021-11-29 NOTE — PROGRESS NOTES
Home Based [facility]   [id number]      Date of Current Visit: 11/23/21     SUMMARY:   Darcy Piper is a 80 y.o. who was referred by patient's daughter based on [facility] recommendation. I have reviewed the RN Referral Intake Note Vahid Keys) which includes information regarding the patient's health, caregiving and social determinants. Social history:  Patient lives with her daughter. GOALS OF CARE / TREATMENT PREFERENCES:     Advance Care Planning:    Primary Decision Maker: Jordan Vivas Child - [phone]  Advance Care Planning 11/19/2021   Patient's Healthcare Decision Maker is: Legal Next of Kin   Primary Decision Maker Name -   Primary Decision Maker Phone Number -   Primary Decision Maker Relationship to Patient -   Confirm Advance Directive Yes, not on file       Code Status:  [] Attempt Resuscitation       [] Do Not Attempt Resuscitation       ASSESSMENT AND PLAN       ICD-10-CM ICD-9-CM    1. Essential hypertension  I10 401.9    2. Chronic heart failure with preserved ejection fraction (HCC)  I50.32 428.9    3. Pulmonary hypertension (formerly [facility])  I27.20 416.8    4. Chronic obstructive pulmonary disease, unspecified COPD type ([facility]  J44.9 496    5. Major depressive disorder with single episode, in remission ([facility]  F32.5 296.25    6. Osteoarthritis, unspecified osteoarthritis type, unspecified site  M19.90 715.90 [facility]   7. Impaired mobility  Z74.09 799.89 REFERRAL TO HOME HEALTH   8. At high risk for falls  Z91.81 V15.88 REFERRAL TO HOME HEALTH   9. History of benzodiazepine use  Z87.898 305.43    10. Establishing care with new doctor, encounter for  Z76.89 V65.8        -New patient: outside records reviewed from previous PCP with [facility] Dr. Juan C Allen (see media). Patient was last evaluated on 11/2/21 after recent fall. She was treated for acute cystitis.  Other medical problems addressed (no changes to medication regimen) included HFpEF, calcification of aorta, frequent falls, emphysema (evidence reportedly seen on 2019 CXR), pulmonary HTN, h/o benzodiazepine use (no longer taking). Patient was referred to Palliative Care and ultimately joined our [facility] program.  -HTN/HFpEF/pHTN: BP slightly high for age on check today. Continue HCTZ and lisinopril for now. We will continue to monitor for need to adjust medications. -COPD: not on any medications for this. Breathing comfortably on exam.  -Depression: stable, continue zoloft. -Osteoarthritis with impaired mobility and high risk for falls: we will consult home health PT/OT. I have also prescribed diclofenac gel for her MSK-related pain.  -History of benzodiazepine use: not taking any benzos at this time.  reviewed. No opioid or benzodiazepine prescriptions in the last year.  -Other medications: we will stop potassium at this time as I do not think this medication is necessary for her at this time. -AMD: counseled regarding importance of AMD. Patient thinks she may have previously completed one and is going to look for her AMD. We will discuss in more detail at follow up.  -Follow up: with our team RN in 2-3 weeks and with me in 4-6 weeks. Will be due for Medicare Wellness Visit. Health Maintenance Due   Topic Date Due    DTaP/Tdap/Td series (1 - Tdap) Never done    Shingrix Vaccine Age 50> (1 of 2) Never done    Bone Densitometry (Dexa) Screening  Never done    Flu Vaccine (1) 09/01/2021    Medicare Yearly Exam  Never done         Patient/family was provided a paper after visit summary prior to conclusion of visit. HISTORY:      CHIEF COMPLAINT:    Chief Complaint   Patient presents with    Arthritis       HPI/SUBJECTIVE:    The patient is: [x] Verbal / [] Nonverbal     Patient is seen at home due to impaired mobility and high fall risk. She has recently had one fall. History is per patient and patient's daughter. She moved downstairs in July due to fall risk. Patient sleeps most of the day. Her waking hours are more like 4-5 PM until 3 am. She walks between her bedroom and the bathroom with a cane. Her appetite is good. She receives meals from meals on wheels. She denies constipation. She denies any skin issues. Sleep quality is good. Mood is good with seroquel. PMH includes b/l mastectomy, knee surgery. She has received 3 doses of the Moderna vaccine. She is applying for Medicaid. COVID-19 vaccination card reviewed:      REVIEW OF SYSTEMS:     Review of Systems   Constitutional: Negative for chills and fever. HENT: Negative for congestion and sore throat. Eyes: Negative for pain, discharge and redness. Respiratory: Negative for cough, shortness of breath and wheezing. Cardiovascular: Negative for chest pain and leg swelling. Gastrointestinal: Negative for abdominal pain, constipation, diarrhea, nausea and vomiting. Genitourinary: Negative for dysuria and frequency. Musculoskeletal: Positive for falls. Negative for neck pain. Skin: Negative for itching and rash. Neurological: Negative for dizziness, weakness and headaches. Psychiatric/Behavioral: Negative for depression and substance abuse. The patient is not nervous/anxious. FUNCTIONAL ASSESSMENT:     Palliative Performance Scale (PPS): 40-60     PSYCHOSOCIAL/SPIRITUAL SCREENING:      Any spiritual / Druze concerns: [] Yes /  [x] No     Caregiver Burnout: [] Yes /  [x] No /  [] No Caregiver Present       PHYSICAL EXAM:     Visit Vitals  BP (!) 152/88   Pulse 78   Resp 14   SpO2 98%       Physical Exam  Constitutional:       General: She is not in acute distress. Appearance: She is not toxic-appearing. HENT:      Head: Normocephalic and atraumatic. Right Ear: External ear normal. Decreased hearing noted. Left Ear: External ear normal. Decreased hearing noted. Nose: Nose normal. No rhinorrhea. Mouth/Throat:      Mouth: Mucous membranes are moist.      Pharynx: Oropharynx is clear.    Eyes: General:         Right eye: No discharge. Left eye: No discharge. Extraocular Movements: Extraocular movements intact. Conjunctiva/sclera: Conjunctivae normal.   Cardiovascular:      Rate and Rhythm: Normal rate. Pulses: Normal pulses. Pulmonary:      Effort: Pulmonary effort is normal. No respiratory distress. Breath sounds: Normal breath sounds. No wheezing. Abdominal:      General: Bowel sounds are normal. There is no distension. Palpations: Abdomen is soft. Tenderness: There is no abdominal tenderness. There is no guarding. Musculoskeletal:         General: No swelling or deformity. Cervical back: Normal range of motion. No rigidity. Skin:     General: Skin is warm and dry. Capillary Refill: Capillary refill takes less than 2 seconds. Neurological:      General: No focal deficit present. Mental Status: She is alert. Comments: There is generalized weakness. Psychiatric:         Mood and Affect: Mood normal.         Behavior: Behavior normal.        HISTORY:     Past Medical and Surgical History reviewed in [facility] on date of initial visit    Patient Active Problem List   Diagnosis Code    Essential hypertension I10    Osteoarthritis M19.90    Major depressive [facility] with single episode, [facility] F32.5    Chronic heart failure with preserved ejection fraction (HCC) I50.32    At high risk for falls Z91.81    Impaired mobility Z74.09    Pulmonary hypertension (HCC) I27.20    Chronic obstructive pulmonary disease (HCC) J44.9    History of benzodiazepine use Z87.898     Family History   Problem Relation Age of Onset    Heart Attack Mother     Stroke Father       Social History     Tobacco Use    Smoking status: Never Smoker    Smokeless tobacco: Never Used   Substance Use Topics    Alcohol use:  Yes     Alcohol/week: 0.8 standard drinks     Types: 1 drink(s) per week     Comment: rare     No Known Allergies   Current Outpatient Medications   Medication Sig    diclofenac (VOLTAREN) 1 % gel Apply  to affected area four (4) times daily.  hydroCHLOROthiazide (HYDRODIURIL) 25 mg tablet Take 25 mg by mouth daily. Indications: high blood pressure    lisinopriL (PRINIVIL, ZESTRIL) 40 mg tablet Take 40 mg by mouth daily.  sertraline (ZOLOFT) 100 mg tablet Take 100 mg by mouth daily.  vit A,C,E-zinc-copper (PreserVision AREDS) cap capsule Take 1 Capsule by mouth daily.  potassium chloride SR (KLOR-CON 10) 10 mEq tablet Take 10 mEq by mouth daily. Indications: HYPOKALEMIA PREVENTION    cholecalciferol, VITAMIN D3, (VITAMIN D3) 5,000 unit tab tablet Take 500 Units by mouth daily. Indications: prevention of vitamin D deficiency    cyanocobalamin 1,000 mcg tablet Take 1,000 mcg by mouth daily. Indications: prevention of vitamin B12 deficiency     No current facility-administered medications for this visit. LAB DATA REVIEWED:     Lab Results   Component Value Date/Time    Hemoglobin A1c 5.7 04/08/2015 01:40 PM     No results found for: MCACR, MCA1, MCA2, MCA3, MCAU, MCAU2, MCALPOCT  No results found for: TSH, TSH2, TSH3, TSHP, TSHEXT, TSHEXT  No results found for: Romeo Lightning, VD3RIA      Lab Results   Component Value Date/Time    WBC 10.9 11/27/2021 12:32 AM    HGB 11.6 11/27/2021 12:32 AM    PLATELET 784 51/32/2751 12:32 AM     Lab Results   Component Value Date/Time    Sodium 135 (L) 11/27/2021 12:32 AM    Potassium 4.4 11/27/2021 12:32 AM    Chloride 102 11/27/2021 12:32 AM    CO2 26 11/27/2021 12:32 AM    BUN 20 11/27/2021 12:32 AM    Creatinine 0.83 11/27/2021 12:32 AM    Calcium 9.6 11/27/2021 12:32 AM      Lab Results   Component Value Date/Time    Alk.  phosphatase 92 11/27/2021 12:32 AM    Protein, total 8.0 11/27/2021 12:32 AM    Albumin 3.0 (L) 11/27/2021 12:32 AM    Globulin 5.0 (H) 11/27/2021 12:32 AM     No results found for: IRON, FE, TIBC, IBCT, PSAT, FERR             Ching Espinoza, MD

## 2021-11-29 NOTE — PATIENT INSTRUCTIONS

## 2021-12-01 ENCOUNTER — TELEPHONE (OUTPATIENT)
Dept: FAMILY MEDICINE CLINIC | Age: 86
End: 2021-12-01

## 2021-12-01 NOTE — TELEPHONE ENCOUNTER
LCSW called pt's dtr to inquire if she had found mom's Medical Power of  document that she was going to look for after initial social work visit on 11/19. No answer. LCSW left voicemail, provided contact information, and encouraged a call back at her convenience.      MEHRDAD Leon, [facility]    [place] Based [address] [place] [address] 8673 (q) [phone]

## 2021-12-15 ENCOUNTER — DOCUMENTATION ONLY (OUTPATIENT)
Dept: FAMILY MEDICINE CLINIC | Age: 86
End: 2021-12-15

## 2021-12-15 NOTE — PROGRESS NOTES
Home Health Certification approval    Initial certification: 36/33/1325    Certification period: 12/06/2021-2/03/2022    [facility]: [id number]    Company: [employer] [facility] [employer]    Diagnosis code:  I10  M17.10  F32.5  F41.9  Z91.81    I have reviewed and agree with plan of care.   PT Reta Quarles MD

## 2021-12-17 ENCOUNTER — TELEPHONE (OUTPATIENT)
Dept: FAMILY MEDICINE CLINIC | Age: 86
End: 2021-12-17

## 2021-12-17 NOTE — TELEPHONE ENCOUNTER
Telephone call from Bhaskar Loredo OT with [address], [address]. Requesting verbal order for OT services  0 week 1 then 1 x week for 6 weeks. Verbal order given.

## 2021-12-20 ENCOUNTER — CLINICAL SUPPORT (OUTPATIENT)
Dept: FAMILY MEDICINE CLINIC | Age: 86
End: 2021-12-20

## 2021-12-20 DIAGNOSIS — E03.9 HYPOTHYROIDISM, UNSPECIFIED TYPE: ICD-10-CM

## 2021-12-20 DIAGNOSIS — I10 ESSENTIAL HYPERTENSION: Primary | ICD-10-CM

## 2021-12-20 PROBLEM — I27.20 PULMONARY HYPERTENSION (HCC): Status: ACTIVE | Noted: 2021-12-20

## 2021-12-20 PROBLEM — Z91.81 AT HIGH RISK FOR FALLS: Status: ACTIVE | Noted: 2021-12-20

## 2021-12-20 PROBLEM — Z74.09 IMPAIRED MOBILITY: Status: ACTIVE | Noted: 2021-12-20

## 2021-12-20 PROBLEM — I50.32 CHRONIC HEART FAILURE WITH PRESERVED EJECTION FRACTION (HCC): Status: ACTIVE | Noted: 2021-12-20

## 2021-12-20 PROBLEM — Z87.898 HISTORY OF BENZODIAZEPINE USE: Status: ACTIVE | Noted: 2021-12-20

## 2021-12-20 PROBLEM — J44.9 CHRONIC OBSTRUCTIVE PULMONARY DISEASE (HCC): Status: ACTIVE | Noted: 2021-12-20

## 2021-12-20 NOTE — PROGRESS NOTES
PURPOSE OF SN VISIT:  FOLLOW UP NEW PATIENT            COMPLAINTS / CONCERNS SHARED:  Patient and dgt True Ramesh reporting the loss of another family member to death, 2 in the past month. POSITIVE FINDINGS OF SYSTEMS ASSESSMENT- patient reports that she is making progress with HH PT and feels stronger. No falls in the past 3 weeks. True Ramesh states that OT will start after the holiday and her goal is to be able to get the patient into the shower upstairs. [place] [facility] that due to her own physical limitations that it is not safe for her to attempt to get patient upstairs or to put her into the shower. Advised that patient needs to be able to take a sink bath in the half bath near her room. Appetite is good, bowels are moving without issue. Pt/Susie with questions about applying Diclofenac gel,  Advised to apply small amount to painful area of back 4 x day and to NOT use heating pad over the area of application. Pt states she is with her usual back pain from spinal stenosis - worse with recent outdoor temperature change.     VITAL SIGNS:  /90 P 78 R 16 O2 sat 95%    PLAN:  Update provider

## 2021-12-21 ENCOUNTER — DOCUMENTATION ONLY (OUTPATIENT)
Dept: FAMILY MEDICINE CLINIC | Age: 86
End: 2021-12-21

## 2021-12-21 NOTE — PROGRESS NOTES
Home Based Primary Care  Plan of Care    [facility] Team Members: Maris Izaguirre MD; Timi Alberto NP; Monisha Weeks, RN; Valente Almendarez, RN; Felisa Palmer RN; MARLIN Gooden  10/17/1922 / [id number]  female    The physician has reviewed and discussed the plan of care with the interdisciplinary group on 12/22/21 and agrees. Date of Initial Visit (Start of Care):    Diagnosis:   Patient Active Problem List   Diagnosis Code    Essential hypertension I10    Osteoarthritis M19.90    Major depressive disorder with single episode, in remission ([facility] F32.5    Chronic heart failure with preserved ejection fraction (HCC) I50.32    At high risk for falls Z91.81    Impaired mobility Z74.09    Pulmonary hypertension (HCC) I27.20    Chronic obstructive pulmonary disease (HCC) J44.9    History of benzodiazepine use Z87.898       Patient status summary: 80 y.o. female who was referred to the [facility] program due to impaired mobility with history of falls. Patient discussed today for initial POC review. Advance Care Planning:  Code status: No Order      Primary Decision MakerCbay Max - [phone]   Advance Care Planning 11/19/2021   Patient's Healthcare Decision Maker is: Legal Next of Kin   Primary Decision Maker Name -   Primary Decision Maker Phone Number -   Primary Decision Maker Relationship to Patient -   Confirm Advance Directive Yes, not on file       DME/Supplies:  Other Cane     No Known Allergies    Nutritional Requirements:   Oral with supported meal preparation    Functional/Activity Level:  Ambulatory with assistance of cane, walker, or support of another person (significant impairment)    Safety Measures:   Fall risk    Acuity Level Rating: Medium    Current Outpatient Medications   Medication Sig    diclofenac (VOLTAREN) 1 % gel Apply  to affected area four (4) times daily.  hydroCHLOROthiazide (HYDRODIURIL) 25 mg tablet Take 25 mg by mouth daily.  Indications: high blood pressure    lisinopriL (PRINIVIL, ZESTRIL) 40 mg tablet Take 40 mg by mouth daily.  sertraline (ZOLOFT) 100 mg tablet Take 100 mg by mouth daily.  vit A,C,E-zinc-copper (PreserVision AREDS) cap capsule Take 1 Capsule by mouth daily.  potassium chloride SR (KLOR-CON 10) 10 mEq tablet Take 10 mEq by mouth daily. Indications: HYPOKALEMIA PREVENTION    cholecalciferol, VITAMIN D3, (VITAMIN D3) 5,000 unit tab tablet Take 500 Units by mouth daily. Indications: prevention of vitamin D deficiency    cyanocobalamin 1,000 mcg tablet Take 1,000 mcg by mouth daily. Indications: prevention of vitamin B12 deficiency     No current facility-administered medications for this visit. The Plan of Care has been initiated for during discussion at interdisciplinary group meeting  and reviewed and updated by the Interdisciplinary Group (IDG) as frequently as the patient condition warrants. Plan of Care by Discipline:    1. Provider  Ongoing evaluation of treatment interventions for specific disease state, Determine need for laboratory assessment based on patient disease status , Create and implement disease /symptom specific plan to manage high risk conditions / symptoms, Enhance visit frequency to monitor high risk health care needs and Initiate conversation regarding health care wishes     2. Nursing  Coordination of care with specialty services, Education regarding disease state, Education for safety; falls and Education for skin care in patients with limited mobility; with focus on preventing skin breakdown    3.  Social Work  Establish therapeutic relationship through in home visits and telephonic touch points, Assess safety concerns and address as appropriate, Assess for caregiver burden and intervene as psychosocially indicated, Assess patient for caregiver needs to optimize psychosocial function and safety and Provide information on available community resources      Plan of Care Orders / Action Items:  -New patient: outside records reviewed from previous PCP with [facility] Dr. Manny Barroso (see media). Patient was last evaluated on 11/2/21 after recent fall. She was treated for acute cystitis. Other medical problems addressed (no changes to medication regimen) included HFpEF, calcification of aorta, frequent falls, emphysema (evidence reportedly seen on 2019 CXR), pulmonary HTN, h/o benzodiazepine use (no longer taking). Patient was referred to Palliative Care and ultimately joined our [facility] program.  -HTN/HFpEF/pHTN: BP slightly high for age on check today. Continue HCTZ and lisinopril for now. We will continue to monitor for need to adjust medications. -COPD: not on any medications for this. Breathing comfortably on exam.  -Depression: stable, continue zoloft. -Osteoarthritis with impaired mobility and high risk for falls: we will consult home health PT/OT. I have also prescribed diclofenac gel for her MSK-related pain.  -History of benzodiazepine use: not taking any benzos at this time.  reviewed. No opioid or benzodiazepine prescriptions in the last year.  -Other medications: we will stop potassium at this time as I do not think this medication is necessary for her at this time. -AMD: counseled regarding importance of AMD. Patient thinks she may have previously completed one and is going to look for her AMD. We will discuss in more detail at follow up.  -Follow up: with our team RN in 2-3 weeks and with me in 4-6 weeks. Will be due for Medicare Wellness Visit.       Health Maintenance   Topic Date Due    DTaP/Tdap/Td series (1 - Tdap) Never done    Shingrix Vaccine Age 50> (1 of 2) Never done    Bone Densitometry (Dexa) Screening  Never done    Flu Vaccine (1) 09/01/2021    Medicare Yearly Exam  Never done    COVID-19 Vaccine  Completed    Pneumococcal 65+ years  Completed         Estimated Visit Frequency:  Monthly Provider Visit  Last MD visit:   SW visits PRN

## 2021-12-22 PROBLEM — Z92.29 COVID-19 VACCINE SERIES COMPLETED: Status: ACTIVE | Noted: 2021-12-22

## 2022-01-03 DIAGNOSIS — I10 ESSENTIAL HYPERTENSION: Primary | ICD-10-CM

## 2022-01-03 RX ORDER — HYDROCHLOROTHIAZIDE 25 MG/1
25 TABLET ORAL DAILY
Qty: 90 TABLET | Refills: 1 | Status: SHIPPED | OUTPATIENT
Start: 2022-01-03 | End: 2022-06-07

## 2022-01-03 NOTE — TELEPHONE ENCOUNTER
Telephone call to Lianet Collier to advise that HCTZ refill was sent to [facility] at [facility] per her request.

## 2022-01-03 NOTE — TELEPHONE ENCOUNTER
Deronda Shone called and stated that the patient needs a refill of hydrochlorothiazide. Sangita Agee said that the patients prescription for this medication is at the [facility] pharmacy near the [address] [place] [address].

## 2022-01-05 ENCOUNTER — TELEPHONE (OUTPATIENT)
Dept: FAMILY MEDICINE CLINIC | Age: 87
End: 2022-01-05

## 2022-01-05 NOTE — TELEPHONE ENCOUNTER
[facility]ospit[facility] called pt's dtr, Shaheen Be, to schedule routine in-home psychosocial assessment. Visit scheduled for Thursday 1/14 @ [facility] stated that mom is doing well, despite 2 deaths in the family over the holidays (her sister and sister's son in law). She denies any acute psychosocial concerns, looks forward to visit from hospitalsW next week.     MEHRDAD Blackburn, [place]    [address]  (w) [phone]

## 2022-01-13 ENCOUNTER — HOME VISIT (OUTPATIENT)
Dept: FAMILY MEDICINE CLINIC | Age: 87
End: 2022-01-13

## 2022-01-14 NOTE — PROGRESS NOTES
[facility] FOR [facility]) & Supportive Services  Main Office: [phone]    Patient name: Jose Queen    Date of visit: 1/13/22    Session today completed with: Nona Anton    Relationship to patient: Daughter    Purpose of visit:  Routine psychosocial assessment of needs and safety, supportive counseling    Visit narrative: LCSW visited with pt and dtr in their residence. Pt was taking a nap in the back bedroom, so LCSW and dtr sat in living room together and had conversation. Dtr stated that she has a  for herself through [facility]. Her  recently applied for Meals on Wheels for pt and her mother, which will be sponsored by "[employer]. Daughter expressed appreciation for Home Based Primary Care services for her mother and how it has been a blessing to not have to figure out how to get mother out to see the doctor. Blessing Cagle is hoping to be cleared to drive again, as her license had been revoked after her seizure when she was in the hospital for her stroke. She said she plans on driving once she is cleared to drive, but does not plan on going places frequently since she is still a little unsteady and has not driven in quite some time. She said having the ability to drive would bring back a sense of freedom which is more important than the necessity of driving right now. Pt talked about recent deaths in the family, including her aunt (pt's sister), and her cousin's . She said she feels well supported by family and friends who can help her with respite if needed, however primarily the caregiving responsibilities fall to Blessing Cagle. She stated she feels confident and competent with caregiving at this time, as mom is able to assist with most ADLs. LCSW provided reflective listening, validation, and emotional support to Blessing Cagle today. Assessment and Plan: Care needs appear to be met.  Pt has access to food, medications, appears to be safe in the home at this time so long as dtr is able to assist with ADL care. Safety may be a concern in the future should pt's health decline or dtr's health decline. Plan for follow up: LCSW provided business card with contact information and encouraged Efrain Guardado to reach out should she have any questions or concerns for LCSW. LCSW to follow up with pt/dtr again in 1-2 months for ongoing psychosocial assessment.        Minor MEHRDAD Rosales, [facility]    [place] Based [address] [place] [address]  (o) [phone]

## 2022-01-19 ENCOUNTER — HOME VISIT (OUTPATIENT)
Dept: FAMILY MEDICINE CLINIC | Age: 87
End: 2022-01-19
Payer: MEDICARE

## 2022-01-19 VITALS
OXYGEN SATURATION: 95 % | SYSTOLIC BLOOD PRESSURE: 154 MMHG | HEART RATE: 60 BPM | RESPIRATION RATE: 14 BRPM | DIASTOLIC BLOOD PRESSURE: 94 MMHG

## 2022-01-19 DIAGNOSIS — F32.5 MAJOR DEPRESSIVE DISORDER WITH SINGLE EPISODE, IN REMISSION (HCC): ICD-10-CM

## 2022-01-19 DIAGNOSIS — I27.20 PULMONARY HYPERTENSION (HCC): ICD-10-CM

## 2022-01-19 DIAGNOSIS — M19.90 OSTEOARTHRITIS, UNSPECIFIED OSTEOARTHRITIS TYPE, UNSPECIFIED SITE: ICD-10-CM

## 2022-01-19 DIAGNOSIS — I10 ESSENTIAL HYPERTENSION: Primary | ICD-10-CM

## 2022-01-19 DIAGNOSIS — Z74.09 IMPAIRED MOBILITY: ICD-10-CM

## 2022-01-19 DIAGNOSIS — J44.9 CHRONIC OBSTRUCTIVE PULMONARY DISEASE, UNSPECIFIED COPD TYPE (HCC): ICD-10-CM

## 2022-01-19 DIAGNOSIS — Z00.00 MEDICARE ANNUAL WELLNESS VISIT, SUBSEQUENT: ICD-10-CM

## 2022-01-19 DIAGNOSIS — I50.32 CHRONIC HEART FAILURE WITH PRESERVED EJECTION FRACTION (HCC): ICD-10-CM

## 2022-01-19 PROCEDURE — 1090F PRES/ABSN URINE INCON ASSESS: CPT | Performed by: FAMILY MEDICINE

## 2022-01-19 PROCEDURE — G8427 DOCREV CUR MEDS BY ELIG CLIN: HCPCS | Performed by: FAMILY MEDICINE

## 2022-01-19 PROCEDURE — 99348 HOME/RES VST EST LOW MDM 30: CPT | Performed by: FAMILY MEDICINE

## 2022-01-19 PROCEDURE — G9717 DOC PT DX DEP/BP F/U NT REQ: HCPCS | Performed by: FAMILY MEDICINE

## 2022-01-19 PROCEDURE — G8536 NO DOC ELDER MAL SCRN: HCPCS | Performed by: FAMILY MEDICINE

## 2022-01-19 PROCEDURE — G8419 CALC BMI OUT NRM PARAM NOF/U: HCPCS | Performed by: FAMILY MEDICINE

## 2022-01-19 PROCEDURE — 1101F PT FALLS ASSESS-DOCD LE1/YR: CPT | Performed by: FAMILY MEDICINE

## 2022-01-19 PROCEDURE — G0439 PPPS, SUBSEQ VISIT: HCPCS | Performed by: FAMILY MEDICINE

## 2022-01-19 NOTE — PROGRESS NOTES
Home Based [address]   [id number]      Date of Current Visit: 1/19/21     SUMMARY:   Donna Hill is a 80 y.o. who was referred by patient's daughter based on [facility] recommendation. I have reviewed the RN Referral Intake Note Farhad Garcia) which includes information regarding the patient's health, caregiving and social determinants. Social history:  Patient lives with her daughter. ASSESSMENT AND PLAN       ICD-10-CM ICD-9-CM    1. Essential hypertension  I10 401.9    2. Major depressive disorder with single episode, in remission ([facility] [address] [facility]  F32.5 296.25    3. Chronic heart failure with preserved ejection fraction (HCC)  I50.32 428.9    4. Chronic obstructive pulmonary disease, unspecified COPD type ([facility]  J44.9 496    5. Pulmonary hypertension ([facility])  I27.20 416.8    6. Osteoarthritis, unspecified osteoarthritis type, unspecified site  M19.90 715.90    7. Impaired mobility  Z74.09 799.89    8. Medicare annual wellness visit, subsequent  Z00.00 V70.0        -HTN/HFpEF/pHTN: BP slightly high for age on check today. Continue HCTZ and lisinopril for now. She is such a high fall risk that I think it is better for her BP to run slightly high than it is to increase her BP medication.  -Depression: stable without complaint today, continue zoloft. -Osteoarthritis with impaired mobility and high risk for falls: continue home health PT/OT which patient is finding helpful. She is using a cane a home. -COPD: not on any medications for this. Breathing comfortably on exam.  -MWV: see separate documentation for MWV. -Follow up: in 2 months, sooner as needed. Will need to discuss AMD.      Health Maintenance Due   Topic Date Due    DTaP/Tdap/Td series (1 - Tdap) Never done    Shingrix Vaccine Age 50> (1 of 2) Never done    Medicare Yearly Exam  Never done       Patient/family was provided a paper after visit summary prior to conclusion of visit.        HISTORY:      CHIEF COMPLAINT:    Chief Complaint   Patient presents with    Hypertension       HPI/SUBJECTIVE:    The patient is: [x] Verbal / [] Nonverbal     Patient is seen at home due to impaired mobility and high fall risk. History is supplemented by patient's daughter. Patient has no complaint today. She has not been checking her blood pressure regularly. Her mood has been stable on zoloft. No behavior or sleep changes per daughter. Patient has been very happy to work with PT/OT. Her last known fall was in November after (according to her daughter) sliding out of her chair. X-rays and lab work were reassuring. Her appetite is good. She eats a variety of foods. Denies any breathing difficulties. REVIEW OF SYSTEMS:     Review of Systems   Constitutional: Negative for chills and fever. HENT: Negative for congestion and sore throat. Eyes: Negative for pain, discharge and redness. Respiratory: Negative for cough, shortness of breath and wheezing. Cardiovascular: Negative for chest pain and leg swelling. Gastrointestinal: Negative for abdominal pain, constipation, diarrhea, nausea and vomiting. Genitourinary: Negative for dysuria and frequency. Musculoskeletal: Positive for falls. Negative for neck pain. Skin: Negative for itching and rash. Neurological: Negative for dizziness, weakness and headaches. Psychiatric/Behavioral: Negative for depression and substance abuse. The patient is not nervous/anxious. PHYSICAL EXAM:     Visit Vitals  BP (!) 154/94   Pulse 60   Resp 14   SpO2 95%       Physical Exam  Constitutional:       General: She is not in acute distress. Appearance: She is not toxic-appearing. HENT:      Head: Normocephalic and atraumatic. Right Ear: External ear normal. Decreased hearing noted. Left Ear: External ear normal. Decreased hearing noted. Nose: Nose normal. No rhinorrhea. Mouth/Throat:      Mouth: Mucous membranes are moist.      Pharynx: Oropharynx is clear.    Eyes: General:         Right eye: No discharge. Left eye: No discharge. Extraocular Movements: Extraocular movements intact. Conjunctiva/sclera: Conjunctivae normal.   Cardiovascular:      Rate and Rhythm: Normal rate. Pulses: Normal pulses. Pulmonary:      Effort: Pulmonary effort is normal. No respiratory distress. Breath sounds: Normal breath sounds. No wheezing. Abdominal:      General: Bowel sounds are normal. There is no distension. Palpations: Abdomen is soft. Tenderness: There is no abdominal tenderness. There is no guarding. Musculoskeletal:         General: No swelling or deformity. Cervical back: Normal range of motion. No rigidity. Skin:     General: Skin is warm and dry. Capillary Refill: Capillary refill takes less than 2 seconds. Neurological:      General: No focal deficit present. Mental Status: She is alert. Comments: There is generalized weakness. Psychiatric:         Mood and Affect: Mood normal.         Behavior: Behavior normal.      Comments: She is pleasant and participates in conversation. HISTORY:     Past Medical and Surgical History reviewed in [facility] on date of initial visit    Patient Active Problem List   Diagnosis Code    Essential hypertension I10    Osteoarthritis M19.90    Major depressive disorder with single episode, in remission ([facility] [address] F32.5    Chronic heart failure with preserved ejection fraction (HCC) I50.32    At high risk for falls Z91.81    Impaired mobility Z74.09    Pulmonary hypertension (HCC) I27.20    Chronic obstructive pulmonary disease (HCC) J44.9    History of benzodiazepine use Z87.898    COVID-19 vaccine series completed Z92.29     Family History   Problem Relation Age of Onset    Heart Attack Mother     Stroke Father       Social History     Tobacco Use    Smoking status: Never Smoker    Smokeless tobacco: Never Used   Substance Use Topics    Alcohol use:  Yes Alcohol/week: 0.8 standard drinks     Types: 1 drink(s) per week     Comment: rare     No Known Allergies   Current Outpatient Medications   Medication Sig    hydroCHLOROthiazide (HYDRODIURIL) 25 mg tablet Take 1 Tablet by mouth daily. Indications: high blood pressure    lisinopriL (PRINIVIL, ZESTRIL) 40 mg tablet Take 40 mg by mouth daily.  sertraline (ZOLOFT) 100 mg tablet Take 100 mg by mouth daily.  vit A,C,E-zinc-copper (PreserVision AREDS) cap capsule Take 1 Capsule by mouth daily.  cholecalciferol, VITAMIN D3, (VITAMIN D3) 5,000 unit tab tablet Take 500 Units by mouth daily. Indications: prevention of vitamin D deficiency    cyanocobalamin 1,000 mcg tablet Take 1,000 mcg by mouth daily. Indications: prevention of vitamin B12 deficiency    diclofenac (VOLTAREN) 1 % gel Apply  to affected area four (4) times daily. No current facility-administered medications for this visit. LAB DATA REVIEWED:     Lab Results   Component Value Date/Time    Hemoglobin A1c 5.7 04/08/2015 01:40 PM     No results found for: MCACR, MCA1, MCA2, MCA3, MCAU, MCAU2, MCALPOCT  No results found for: TSH, TSH2, TSH3, TSHP, TSHEXT, TSHEXT  No results found for: SAM Hernandes      Lab Results   Component Value Date/Time    WBC 10.9 11/27/2021 12:32 AM    HGB 11.6 11/27/2021 12:32 AM    PLATELET 970 26/42/0966 12:32 AM     Lab Results   Component Value Date/Time    Sodium 135 (L) 11/27/2021 12:32 AM    Potassium 4.4 11/27/2021 12:32 AM    Chloride 102 11/27/2021 12:32 AM    CO2 26 11/27/2021 12:32 AM    BUN 20 11/27/2021 12:32 AM    Creatinine 0.83 11/27/2021 12:32 AM    Calcium 9.6 11/27/2021 12:32 AM      Lab Results   Component Value Date/Time    Alk.  phosphatase 92 11/27/2021 12:32 AM    Protein, total 8.0 11/27/2021 12:32 AM    Albumin 3.0 (L) 11/27/2021 12:32 AM    Globulin 5.0 (H) 11/27/2021 12:32 AM     No results found for: IRON, FE, TIBC, IBCT, PSAT, FERR             Anneliese Allison, MD

## 2022-01-19 NOTE — PATIENT INSTRUCTIONS

## 2022-01-27 ENCOUNTER — DOCUMENTATION ONLY (OUTPATIENT)
Dept: FAMILY MEDICINE CLINIC | Age: 87
End: 2022-01-27

## 2022-01-27 NOTE — PROGRESS NOTES
Home Based Primary Care  Plan of Care    [facility] Team Members: Harriet Walker MD; Karin Dykes NP; Regi Acosta NP; Eugenio Leo, RN; Mary De Jesus, RN; Felisa Palmer, JEANNIE; MARLIN Woodarddamaris Fountaines  10/17/1922 / [id number]  female    The physician has reviewed and discussed the plan of care with the interdisciplinary group on 2/1/2022 and agrees. Date of Initial Visit (Start of Care):    Diagnosis:   Patient Active Problem List   Diagnosis Code    Essential hypertension I10    Osteoarthritis M19.90    Major depressive disorder with single episode, in remission ([facility] F32.5    Chronic heart failure with preserved ejection fraction (HCC) I50.32    At high risk for falls Z91.81    Impaired mobility Z74.09    Pulmonary hypertension (HCC) I27.20    Chronic obstructive pulmonary disease (HCC) J44.9    History of benzodiazepine use Z87.898    COVID-19 vaccine series completed Z92.29       Patient status summary: 80 y.o. female who was referred to the [facility] program due to impaired mobility with history of falls. Patient discussed today for initial POC review. Advance Care Planning:  Code status: No Order      Primary Decision Maker: Karolina Mckay Fdxke - [phone]   Advance Care Planning 11/19/2021   Patient's Healthcare Decision Maker is: Legal Next of Kin   Primary Decision Maker Name -   Primary Decision Maker Phone Number -   Primary Decision Maker Relationship to Patient -   Confirm Advance Directive Yes, not on file       DME/Supplies:  Other Cane     No Known Allergies    Nutritional Requirements:   Oral with supported meal preparation    Functional/Activity Level:  Ambulatory with assistance of cane, walker, or support of another person (significant impairment)    Safety Measures:   Fall risk    Acuity Level Rating: Medium    Current Outpatient Medications   Medication Sig    hydroCHLOROthiazide (HYDRODIURIL) 25 mg tablet Take 1 Tablet by mouth daily.  Indications: high blood pressure    diclofenac (VOLTAREN) 1 % gel Apply  to affected area four (4) times daily.  lisinopriL (PRINIVIL, ZESTRIL) 40 mg tablet Take 40 mg by mouth daily.  sertraline (ZOLOFT) 100 mg tablet Take 100 mg by mouth daily.  vit A,C,E-zinc-copper (PreserVision AREDS) cap capsule Take 1 Capsule by mouth daily.  cholecalciferol, VITAMIN D3, (VITAMIN D3) 5,000 unit tab tablet Take 500 Units by mouth daily. Indications: prevention of vitamin D deficiency    cyanocobalamin 1,000 mcg tablet Take 1,000 mcg by mouth daily. Indications: prevention of vitamin B12 deficiency     No current facility-administered medications for this visit. The Plan of Care has been initiated for during discussion at interdisciplinary group meeting  and reviewed and updated by the Interdisciplinary Group (IDG) as frequently as the patient condition warrants. Plan of Care by Discipline:    1. Provider  Ongoing evaluation of treatment interventions for specific disease state, Determine need for laboratory assessment based on patient disease status , Create and implement disease /symptom specific plan to manage high risk conditions / symptoms, Enhance visit frequency to monitor high risk health care needs and Initiate conversation regarding health care wishes     2. Nursing  Coordination of care with specialty services, Education regarding disease state, Education for safety; falls and Education for skin care in patients with limited mobility; with focus on preventing skin breakdown    3.  Social Work  Assess safety concerns and address as appropriate, Assess for caregiver burden and intervene as psychosocially indicated, Assess patient for caregiver needs to optimize psychosocial function and safety, Provide information on available community resources and Assess current Advance Care Planning documents and make ACP recommendations as appropriate      Plan of Care Orders / Action Items:    -HTN/HFpEF/pHTN: BP slightly high for age on check, encouraged patient and dgt to check BPs and report continued elevation to our office. Continue HCTZ and lisinopril for now. We will continue to monitor for need to adjust medications. -COPD: not on any medications for this. No dyspnea or SOB on exam.  -Depression: stable, continue zoloft. -Osteoarthritis with impaired mobility and high risk for falls: Continues with home health PT/OT which pt reports she is making progress. She remains high fall risk with last fall in November in which she slipped from edge of bed to floor, was seen in ED where x-rays and labs were reassuring. -AMD: counseled regarding importance of AMD.  We will continue to discuss in more detail at follow ups.         Health Maintenance   Topic Date Due    DTaP/Tdap/Td series (1 - Tdap) Never done    Shingrix Vaccine Age 50> (1 of 2) Never done    Medicare Yearly Exam  Never done    Bone Densitometry (Dexa) Screening  01/19/2023 (Originally 10/17/1987)    Flu Vaccine  Completed    COVID-19 Vaccine  Completed    Pneumococcal 65+ years  Completed         Estimated Visit Frequency:  Every 2 month Provider Visit  Last MD visit: 1/19/2022  SW visits PRN

## 2022-01-31 NOTE — PROGRESS NOTES
[address]  [address] [facility] Estelita Haji   [phone]    Date of visit: 1/19/22    This is an Subsequent Medicare Annual Wellness Visit (AWV), (Performed more than 12 months after effective date of Medicare Part B enrollment and 12 months after last preventive visit, Once in a lifetime)    I have reviewed the patient's medical history in detail and updated the computerized patient record. History obtained from: child and the patient. Concerns today   (Patient understands that medical problems addressed today may incur additional cost as this is a preventive visit)  -See separate encounter for documentation regarding chronic medical conditions.     History     Patient Active Problem List   Diagnosis Code    Essential hypertension I10    Osteoarthritis M19.90    Major depressive disorder with single episode, in remission ([facility] [address] F32.5    Chronic heart failure with preserved ejection fraction (HCC) I50.32    At high risk for falls Z91.81    Impaired mobility Z74.09    Pulmonary hypertension (HCC) I27.20    Chronic obstructive pulmonary disease (HCC) J44.9    History of benzodiazepine use Z87.898    COVID-19 vaccine series completed Z92.29     Past Medical History:   Diagnosis Date    Arthritis     knees and back    Cancer 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    Psychiatric disorder     anxiety      Past Surgical History:   Procedure Laterality Date    HX APPENDECTOMY      HX CATARACT REMOVAL Bilateral     HX HYSTERECTOMY  1968    HX MASTECTOMY Left 1956    lypmph node removal    HX MASTECTOMY Right 1994    HX ORTHOPAEDIC Left     arthoscopic knee surgery    HX ORTHOPAEDIC  1980    ankle surgery    HX ORTHOPAEDIC  4/20/15    Left unicompartmental knee replacement, Right knee injection  HX UROLOGICAL  2014/11    prolapsed bladder    NEUROLOGICAL PROCEDURE UNLISTED      back surgery from spinal stenosis     No Known Allergies  Current Outpatient Medications   Medication Sig Dispense Refill    hydroCHLOROthiazide (HYDRODIURIL) 25 mg tablet Take 1 Tablet by mouth daily. Indications: high blood pressure 90 Tablet 1    lisinopriL (PRINIVIL, ZESTRIL) 40 mg tablet Take 40 mg by mouth daily.  sertraline (ZOLOFT) 100 mg tablet Take 100 mg by mouth daily.  vit A,C,E-zinc-copper (PreserVision AREDS) cap capsule Take 1 Capsule by mouth daily.  cholecalciferol, VITAMIN D3, (VITAMIN D3) 5,000 unit tab tablet Take 500 Units by mouth daily. Indications: prevention of vitamin D deficiency      cyanocobalamin 1,000 mcg tablet Take 1,000 mcg by mouth daily. Indications: prevention of vitamin B12 deficiency      diclofenac (VOLTAREN) 1 % gel Apply  to affected area four (4) times daily. 1 Each 2     Family History   Problem Relation Age of Onset    Heart Attack Mother     Stroke Father      Social History     Tobacco Use    Smoking status: Never Smoker    Smokeless tobacco: Never Used   Substance Use Topics    Alcohol use: Yes     Alcohol/week: 0.8 standard drinks     Types: 1 drink(s) per week     Comment: rare       Specialists/Care Team   Gracie Brittle has established care with the following healthcare providers:  -PCP: Kye Alva MD  -She does not follow with any specialists at this time. Health Risk Assessment     Demographics   female  80 y.o. BLACK/    General Health Questions   -During the past 4 weeks:   -how would you rate your health in general? Fair   -how often have you been bothered by feeling dizzy when standing up? no   -how much have you been bothered by bodily pain? Moderately, she has chronic osteoarthritis   -Have you noticed any hearing difficulties?  yes   -has your physical and emotional health limited your social activities with family or friends? She mostly stays at home    Emotional Health Questions   -Do you have a history of depression, anxiety, or emotional problems? yes  -Over the past 2 weeks, have you felt down, depressed or hopeless? No, well controlled with medication  -Over the past 2 weeks, have you felt little interest or pleasure in doing things? no, well controlled with medication    Health Habits   Patient eats well and a good variety per daughter  Do you exercise regularly? no    Activities of Daily Living and Functional Status   -Do you need help with eating, walking, dressing, bathing, toileting, the phone, transportation, shopping, preparing meals, housework, laundry, medications or managing money? yes  -In the past four weeks, was someone available to help you if you needed and wanted help with anything? yes  -Are you confident are you that you can control and manage most of your health problems? yes and with daughter's help  -Have you been given information to help you keep track of your medications? yes  -How often do you have trouble taking your medications as prescribed? never    Fall Risk and Home Safety   Have you fallen 2 or more times in the past year? yes  Do you have smoke detectors and check them regularly? yes  Do you have difficulties driving a car? N/a as she does not drive  Do you always fasten your seat belt when you are in a car? yes    Review of Systems (if indicated for problems addressed today)   -See separate encounter for documentation regarding chronic medical conditions. Physical Examination     Vitals:    01/19/22 1358   BP: (!) 154/94   Pulse: 60   Resp: 14   SpO2: 95%     Was the patient's timed Up & Go test unsteady or longer than 30 seconds?  Patient is high fall risk, so this was not completed at time of visit.  -See separate encounter for documentation regarding chronic medical conditions and exam.    Evaluation of Cognitive Function   Mood/affect:  happy  Orientation: Person, Place and Situation  Appearance: age appropriate and well dressed  Family member/caregiver input: daughter is present and believes her mother is relatively stable at this time    Additional exam if indicated for problems addressed today:  -See separate encounter for documentation regarding chronic medical conditions. Advice/Referrals/Counseling (as indicated)   Education and counseling provided for any problems identified above:   -See separate encounter for documentation regarding chronic medical conditions. Preventive Services   Patient is outside of age window for most screening tests. Importance of vaccinations discussed. Discussion of Advance Directive   Discussed with Christofer Spicer her ability to prepare and advance directive in the case that an injury or illness causes her to be unable to make health care decisions. Assessment/Plan       ICD-10-CM ICD-9-CM    1. Essential hypertension  I10 401.9    2. Major depressive disorder with single episode, in remission ([facility]  F32.5 296.25    3. Chronic heart failure with preserved ejection fraction (HCC)  I50.32 428.9    4. Chronic obstructive pulmonary disease, unspecified COPD type ([facility]  J44.9 496    5. Pulmonary hypertension ([facility])  I27.20 416.8    6. Osteoarthritis, unspecified osteoarthritis type, unspecified site  M19.90 715.90    7. Impaired mobility  Z74.09 799.89    8. Medicare annual wellness visit, subsequent  Z00.00 V70.0        -MWV completed. -Patient and daughter will consider tetatnus and shingles vaccine.  -See separate encounter for documentation regarding chronic medical conditions.       Douglas Villanueva MD

## 2022-02-02 ENCOUNTER — TELEPHONE (OUTPATIENT)
Dept: FAMILY MEDICINE CLINIC | Age: 87
End: 2022-02-02

## 2022-02-02 NOTE — TELEPHONE ENCOUNTER
Jane, an OT with [facility] is calling to obtain verbal orders for 6 additional OT visits.  # [phone].

## 2022-02-05 RX ORDER — SERTRALINE HYDROCHLORIDE 100 MG/1
100 TABLET, FILM COATED ORAL DAILY
Qty: 30 TABLET | Refills: 1 | Status: SHIPPED | OUTPATIENT
Start: 2022-02-05 | End: 2022-03-03 | Stop reason: SDUPTHER

## 2022-02-06 NOTE — TELEPHONE ENCOUNTER
Ms Mariana Gomez daughter called after hours coverage requesting a refill of her sertraline  Sent script to her pharmacy

## 2022-02-07 ENCOUNTER — TELEPHONE (OUTPATIENT)
Dept: FAMILY MEDICINE CLINIC | Age: 87
End: 2022-02-07

## 2022-02-07 ENCOUNTER — TELEPHONE (OUTPATIENT)
Dept: PALLATIVE CARE | Age: 87
End: 2022-02-07

## 2022-02-07 DIAGNOSIS — I10 ESSENTIAL HYPERTENSION: Primary | ICD-10-CM

## 2022-02-07 RX ORDER — LISINOPRIL 40 MG/1
40 TABLET ORAL DAILY
Qty: 90 TABLET | Refills: 1 | Status: SHIPPED | OUTPATIENT
Start: 2022-02-07 | End: 2022-06-07

## 2022-02-07 NOTE — TELEPHONE ENCOUNTER
Montez Randhawa called and left a VM that the patient needs a refill on their lisinopril prescription. She asks that someone call her.

## 2022-02-07 NOTE — TELEPHONE ENCOUNTER
Call placed to inform patient's caregiver, Hossein Resendiz that Lisinopril was sent to [facility] on [address] [place] [address].

## 2022-02-11 ENCOUNTER — TELEPHONE (OUTPATIENT)
Dept: FAMILY MEDICINE CLINIC | Age: 87
End: 2022-02-11

## 2022-02-11 NOTE — TELEPHONE ENCOUNTER
LCSW called pt's dtr to schedule routine in-home social work visit for next Friday 2/17. No answer. LCSW will continue efforts to reach dtr and schedule visit.      MEHRDAD Muse, [facility]    [address]  (a) [phone]

## 2022-02-17 ENCOUNTER — TELEPHONE (OUTPATIENT)
Dept: FAMILY MEDICINE CLINIC | Age: 87
End: 2022-02-17

## 2022-02-17 NOTE — TELEPHONE ENCOUNTER
LCSW called pt's dtr, Morro Wylie, and confirmed tomorrow afternoon's in-home social work visit.      MEHRDAD Diaz, [facility]    [address]  (j) [phone]

## 2022-02-18 ENCOUNTER — HOME VISIT (OUTPATIENT)
Dept: FAMILY MEDICINE CLINIC | Age: 87
End: 2022-02-18

## 2022-02-18 NOTE — PROGRESS NOTES
[address] Office: [phone]    Patient name: Gracie Brittle    Date of visit: 2/18/22    Session today completed with: Nathan Castillo    Relationship to patient: Daughter    Purpose of visit: Routine in-home visit for psychosocial support, assessment of needs    Visit narrative: LCSW visited with pt's dtr in the home where she resides with pt. Pt was asleep in the back bedroom. Alonso Guevara states that mom sleeps from 4am-4pm, and had not yet woken up today when LCSW visited at 1:00pm. LCSW sat with Rajatharper Ismael in the front living room. Alonso Guevara stated mom's home health company, [address] [place] [address], needs an order/referral from [facility] physician to re-start PT for mom. LCSW passed message along to team.     Alonso Guevara stated that she has been cleared by her neurologist to drive again, but her car is not working so she needs to have car fixed and then she plans on \"practicing\" driving again, as it has been quite awhile since she has driven. Her plan is to drive only to the grocery store or pharmacy, not drive long distances any more. She feels confident with her caregiving abilities, given her own physical limitations. She and mom both have Meals on Wheels delivered for the week, and have [address] for PT/OT/nursing/speech. LCSW provided reassurance, validation, and encouragement to pt's dtr who is primary caregiver. She expressed appreciation for the visit today. Plan for follow up: LCSW will reach out to Alonso Candelarias in 1 month for routine check-in.       MEHRDAD Seo, [place]    Home Based [address] [place] [address]  P) [phone]

## 2022-02-22 ENCOUNTER — TELEPHONE (OUTPATIENT)
Dept: FAMILY MEDICINE CLINIC | Age: 87
End: 2022-02-22

## 2022-02-22 NOTE — TELEPHONE ENCOUNTER
Per report from Jonas Lee LCSW, dgt [place] says that [facility] needs a new order from us in order to resume [place] PT services. Telephone call to [facility] to inquire status. Pt is still being seen by [facility] and OT. PT saw her on 2/17 and will visit again today. They do not need any additional documentation or orders from us at this time.

## 2022-02-28 ENCOUNTER — DOCUMENTATION ONLY (OUTPATIENT)
Dept: FAMILY MEDICINE CLINIC | Age: 87
End: 2022-02-28

## 2022-02-28 NOTE — PROGRESS NOTES
Home Health Certification approval    Initial certification: 15/74/2845    Certification period: 2/4/2022-4/4/2022    [facility]: [id number]    Company: [employer] [facility]    Diagnosis code:  I10  M17.10  F32.5  F41.9  Z91.81    I have reviewed and agree with plan of care.   PT 1wk7  OT 1wk1,1Q2wk2,1wk1    Samira Soni MD

## 2022-03-03 DIAGNOSIS — F32.5 MAJOR DEPRESSIVE DISORDER WITH SINGLE EPISODE, IN REMISSION (HCC): Primary | ICD-10-CM

## 2022-03-03 RX ORDER — SERTRALINE HYDROCHLORIDE 100 MG/1
100 TABLET, FILM COATED ORAL DAILY
Qty: 90 TABLET | Refills: 1 | Status: SHIPPED | OUTPATIENT
Start: 2022-03-03 | End: 2022-06-07

## 2022-03-18 PROBLEM — Z92.29 COVID-19 VACCINE SERIES COMPLETED: Status: ACTIVE | Noted: 2021-12-22

## 2022-03-18 PROBLEM — I27.20 PULMONARY HYPERTENSION (HCC): Status: ACTIVE | Noted: 2021-12-20

## 2022-03-19 PROBLEM — Z74.09 IMPAIRED MOBILITY: Status: ACTIVE | Noted: 2021-12-20

## 2022-03-19 PROBLEM — J44.9 CHRONIC OBSTRUCTIVE PULMONARY DISEASE (HCC): Status: ACTIVE | Noted: 2021-12-20

## 2022-03-19 PROBLEM — I50.32 CHRONIC HEART FAILURE WITH PRESERVED EJECTION FRACTION (HCC): Status: ACTIVE | Noted: 2021-01-01

## 2022-03-19 PROBLEM — Z91.81 AT HIGH RISK FOR FALLS: Status: ACTIVE | Noted: 2021-12-20

## 2022-03-19 PROBLEM — Z87.898 HISTORY OF BENZODIAZEPINE USE: Status: ACTIVE | Noted: 2021-01-01

## 2022-03-20 PROBLEM — F32.5 MAJOR DEPRESSIVE DISORDER WITH SINGLE EPISODE, IN REMISSION (HCC): Status: ACTIVE | Noted: 2021-11-29

## 2022-03-20 PROBLEM — I10 ESSENTIAL HYPERTENSION: Status: ACTIVE | Noted: 2021-11-29

## 2022-03-20 PROBLEM — M19.90 OSTEOARTHRITIS: Status: ACTIVE | Noted: 2021-01-01

## 2022-03-23 ENCOUNTER — HOME VISIT (OUTPATIENT)
Dept: FAMILY MEDICINE CLINIC | Age: 87
End: 2022-03-23
Payer: MEDICARE

## 2022-03-23 VITALS
HEART RATE: 74 BPM | DIASTOLIC BLOOD PRESSURE: 76 MMHG | TEMPERATURE: 98.1 F | RESPIRATION RATE: 18 BRPM | SYSTOLIC BLOOD PRESSURE: 148 MMHG | OXYGEN SATURATION: 98 %

## 2022-03-23 DIAGNOSIS — Z91.81 AT HIGH RISK FOR FALLS: ICD-10-CM

## 2022-03-23 DIAGNOSIS — F32.5 MAJOR DEPRESSIVE DISORDER WITH SINGLE EPISODE, IN REMISSION (HCC): ICD-10-CM

## 2022-03-23 DIAGNOSIS — J44.9 CHRONIC OBSTRUCTIVE PULMONARY DISEASE, UNSPECIFIED COPD TYPE (HCC): ICD-10-CM

## 2022-03-23 DIAGNOSIS — I50.32 CHRONIC HEART FAILURE WITH PRESERVED EJECTION FRACTION (HCC): ICD-10-CM

## 2022-03-23 DIAGNOSIS — M19.90 OSTEOARTHRITIS, UNSPECIFIED OSTEOARTHRITIS TYPE, UNSPECIFIED SITE: ICD-10-CM

## 2022-03-23 DIAGNOSIS — I10 ESSENTIAL HYPERTENSION: Primary | ICD-10-CM

## 2022-03-23 DIAGNOSIS — Z71.89 ADVANCED DIRECTIVES, COUNSELING/DISCUSSION: ICD-10-CM

## 2022-03-23 PROCEDURE — 99349 HOME/RES VST EST MOD MDM 40: CPT | Performed by: NURSE PRACTITIONER

## 2022-03-23 NOTE — PATIENT INSTRUCTIONS
Learning About Getting More Protein  How does your body use protein? Protein is an essential part of our diets. It is made up of chemicals called amino acids. Your body needs protein to help build and repair muscle, skin, and other body tissues. Protein also helps fight infection, balance body fluids, and carry oxygen through the body. How much protein do you need? How much protein you need each day depends on your age, sex, and how active you are. It's recommended that most adults eat 5 to 7 ounces of protein foods a day. Sometimes you may need to eat more protein. Your doctor may advise you on the right amount of protein you need. What foods contain protein? Protein is found in a variety of foods. High-protein foods include lean meat, poultry, and fish. A serving of these foods is 2 to 3 ounces. (3 ounces is about the size and thickness of a deck of cards.)  Protein isn't just found in meat. If you are looking for other types of protein, the following foods are equal to about 1 ounce of meat:  · ¼ cup of cooked beans, peas, or lentils  · ¼ cup of tofu (about 2 ounces)  · 2 Tbsp of hummus  · ½ ounce of nuts or seeds (for example, 12 almonds or 7 walnut halves)  · 1 egg  · 1 Tbsp of peanut butter or other nut or seed butter  Other sources of protein include cheese, milk, and other milk products. You can also buy protein bars, drinks, and powders. Check the nutrition label for the amount of protein in each serving. What are some tips for getting more protein? You can get more protein in your food by adding high-protein ingredients. For example, you can:  · Add powdered milk to other foods, such as pudding or soups. · Add powdered protein to fruit smoothies and cooked cereal.  · Add beans to soup and chili. · Add nuts, seeds, or wheat germ to yogurt. You can also:  · Spread peanut butter onto a banana. · Mix cottage cheese into noodle dishes or casseroles.   · Sprinkle hard-boiled eggs on a salad.  · Grate cheese over vegetables and soups. Where can you learn more? Go to http://www.gray.com/  Enter X375 in the search box to learn more about \"Learning About Getting More Protein. \"  Current as of: September 8, 2021               Content Version: 13.2  © 6122-1727 [employer], [employer]. Care instructions adapted under license by [employer] (which disclaims liability or warranty for this information). If you have questions about a medical condition or this instruction, always ask your healthcare professional. [address] any warranty or liability for your use of this information.

## 2022-03-23 NOTE — PROGRESS NOTES
Advance Care Planning     General Advance Care Planning (ACP) Conversation      Date of Conversation: 3/23/2022  Conducted with: Patient with Decision Making Capacity, daughter Trevor is primary caretaker and participated in discussion    Healthcare Decision Maker:     Primary Decision Maker: Eulalio Baires - Child - [phone]  Click here to [address] including selection of the Healthcare Decision Maker Relationship (ie \"Primary\")      Today we documented Decision Maker(s) consistent with Legal Next of Kin hierarchy. Content/Action Overview: Has ACP document(s) NOT on file - requested patient to provide  Reviewed DNR/DNI and patient thinks she would want DNR status, but wants to read over the form and consider further  Topics discussed: treatment goals, benefit/burden of treatment options, hospitalization preferences and resuscitation preferences  Additional Comments: Discussed wishes regarding CPR and she states she would not want that and would want to allow a natural death. She was given DDNR form, and would like to read over it and consider before signing at visit today. She would want hospitalization for potentially reversible conditions such as fall with fracture or infection requiring IV antibiotics.        Length of Voluntary ACP Conversation in minutes:  <16 minutes (Non-Billable)    Lee Elliott NP

## 2022-03-23 NOTE — PROGRESS NOTES
Home Based [address] [facility]   [phone]         NOTE: Home Based Primary Care is a PROVIDER (MD/NP) based interdisciplinary practice (RN, LCSW, Pharmacy, Dietician) for patients who cannot access (or have a taxing effort) primary / specialty medical care in an office setting. [facility] is NOT [employer] but works with [facility] when there is a skilled need. Our MD/NPs are integral in Care Transitions; PLEASE CALL [phone] this patient arrives in the Emergency Department or Hospital.           Date of Current Visit: 3/23/2022      SUMMARY:   Shashi Frias is a 80 y.o. F, established patient of [facility] seen today in the home due to taxing effort to seek primary care services in the community s/t impaired mobility, high fall risk. Daughter is present for visit today and assists in history. ASSESSMENT AND PLAN       ICD-10-CM ICD-9-CM    1. Essential hypertension  I10 401.9    2. At high risk for falls  Z91.81 V15.88    3. Chronic heart failure with preserved ejection fraction (HCC)  I50.32 428.9    4. Chronic obstructive pulmonary disease, unspecified COPD type ([facility]  J44.9 496    5. Osteoarthritis, unspecified osteoarthritis type, unspecified site  M19.90 715.90    6. Major depressive disorder with single episode, in remission ([facility]  F32.5 296.25    7. Advanced directives, counseling/discussion  Z71.89 V65.49        -HTN: BP has been high at previous visits but just within goal <150/90 today. She has not taken BP medications yet this morning. Will treat conservatively due to her high fall risk and age. Continue HCTZ and lisinopril. Next follow-up scheduled for an afternoon visit, so expect we will get a better picture of her BP after medication at that time. History of falls/High fall risk- Last fall Nov 2021 with ED visit- no acute injuries or fractures noted at that time. Weakness of BLE.   She continues to work with [facility] once weekly and finds this helpful. Has walker and wheelchair in home but ambulates primarily with a cane. -HFpEF:  Stable. Appears euvolemic on exam today. Continue HCTZ and lisinopril. -COPD: No medications at this time. No recent exacerbations or symptoms, breathing comfortably on exam today. -Depression: Reports mood is stable. States \"I don't worry about anything. \" continue zoloft. 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 She continues to complain of pain 5/10 in lower back that is at her baseline. She uses Voltaren gel. Continues to work with PT once weekly. She is taking Canna-LS, which is CBD supplements. Daughter states she has discussed that it is not regulated and is expensive, but this is non-negotiable to patient and feels it is helpful. Advised caution with drowsiness and falls. We discussed labwork (previous results stable with low albumin, encouraged protein in diet). Discussed that she is eligible for shingles vaccine and Tdap, but these are not available for mobile services. Advanced Medical Directives Discussion:   Daughter, Alonso Guevara, states that she has completed AMD with a , verified that it includes medical wishes and is not just financial.  She was unable to locate a copy during visit but states she will try to find it, and if unable will contact  for another copy. Discussed general medical wishes with patient, daughter was present for conversation. Patient states that she would like to go to the hospital for potentially reversible events such as falls with fracture, infections needing IV antibiotics. We discussed her thoughts on code status and she stated she \"wouldn't want all of that. \"  Confirmed if she were found without a pulse or breathing she would not want CPR or defibrillation. She had a sister that passed away at 80 in her sleep.   Asked daughter if this is what she had stated in her wishes before and she does not remember, but thinks it sounds like what her mother would want. Discussed the purpose of a DDNR and patient would like to read it and consider further, but feels she will most likely want to sign at next visit. Copy left in home for review. Follow up: on 5/17/22 with NP. Will need follow-up on AMD and DDNR at that time. Patient/family was provided a paper after visit summary prior to conclusion of visit. HISTORY:      CHIEF COMPLAINT:    Chief Complaint   Patient presents with    Follow Up Chronic Condition    Hypertension    Fall       HPI/SUBJECTIVE:    The patient is: [x] Verbal / [] Nonverbal     Ms. Evy Tinoco is a 79 y/o F seen today for follow-up visit of chronic medical conditions. She lives at home with her daughter, Tigist Wylie. She manages her own medications- she keeps a journal showing which ones she has taken for the day (checks them off). She has had no falls since November 2021, and she is continuing to work with PT once weekly. Her daughter says she tries to cancel the visits at times, but they encourage her and she does feel she is making progress. She has a walker in the home, but ambulates with a cane. Her daughter is removing clutter in the home to clear pathways. She feels that she has what she needs to take care of her mom, but \"knows when to step out of the way. \"      Seen today, patient is resting in bed awake. She is very hard of hearing, but answers questions appropriately. She is alert and oriented to self, place, and date. She has not yet taken medications or eaten breakfast.  She reports pain at around 5/10 in her lower back (points to sacral area) that is chronic, and she feels that this is tolerable for her. She reports she has been in a good mood recently, stating \"I don't worry about anything. \"  She is sleeping well and has a good appetite, she does not think she has lost any weight.   She feels she understands her medications and is taking as prescribed. She does not have any health concerns today. REVIEW OF SYSTEMS:     Review of Systems   Constitutional: Negative for chills, fever, malaise/fatigue and weight loss. HENT: Negative for congestion and sore throat. Eyes: Negative for blurred vision, discharge and redness. Respiratory: Negative for cough, shortness of breath and wheezing. Cardiovascular: Negative for chest pain, palpitations and leg swelling. Gastrointestinal: Negative for abdominal pain, blood in stool, constipation, diarrhea, nausea and vomiting. Genitourinary: Negative for dysuria and frequency. Musculoskeletal: Positive for back pain and falls. Skin: Negative for itching and rash. Neurological: Positive for weakness. Negative for dizziness, speech change and headaches. Psychiatric/Behavioral: Negative for depression and memory loss. The patient is not nervous/anxious and does not have insomnia. PHYSICAL EXAM:     Visit Vitals  BP (!) 148/76   Pulse 74   Temp 98.1 °F (36.7 °C) (Temporal)   Resp 18   SpO2 98%       Physical Exam  Constitutional:       General: She is not in acute distress. Appearance: She is not toxic-appearing. Comments: Thin, well-groomed female   HENT:      Head: Normocephalic and atraumatic. Right Ear: External ear normal. Decreased hearing noted. Left Ear: External ear normal. Decreased hearing noted. Ears:      Comments: Very hard of hearing. Has hearing aids but does not wear     Nose: Nose normal. No rhinorrhea. Mouth/Throat:      Mouth: Mucous membranes are moist.      Pharynx: Oropharynx is clear. Comments: Wearing lower dentures only  Eyes:      General:         Right eye: No discharge. Left eye: No discharge. Extraocular Movements: Extraocular movements intact. Conjunctiva/sclera: Conjunctivae normal.      Pupils: Pupils are equal, round, and reactive to light.    Neck:      Vascular: No carotid bruit. Cardiovascular:      Rate and Rhythm: Normal rate and regular rhythm. Pulses: Normal pulses. Heart sounds: Normal heart sounds. No murmur heard. No gallop. Pulmonary:      Effort: Pulmonary effort is normal. No respiratory distress. Breath sounds: Normal breath sounds. No wheezing or rhonchi. Abdominal:      General: Bowel sounds are normal. There is no distension. Palpations: Abdomen is soft. There is no mass. Tenderness: There is no abdominal tenderness. There is no guarding. Musculoskeletal:         General: No deformity. Right lower leg: No edema. Left lower leg: No edema. Skin:     General: Skin is warm and dry. Capillary Refill: Capillary refill takes less than 2 seconds. Findings: No bruising or rash. Neurological:      General: No focal deficit present. Mental Status: She is alert and oriented to person, place, and time. Comments: Generalized weakness. Pain and limited ROM left shoulder (history of surgery)   Psychiatric:         Mood and Affect: Mood normal.         Behavior: Behavior normal.         Thought Content:  Thought content normal.        HISTORY:     Past Medical and Surgical History reviewed in [facility] on date of initial visit    Patient Active Problem List   Diagnosis Code    Essential hypertension I10    Osteoarthritis M19.90    Major depressive disorder with single episode, in remission ([facility] F32.5    Chronic heart failure with preserved ejection fraction (HCC) I50.32    At high risk for falls Z91.81    Impaired mobility Z74.09    Pulmonary hypertension (HCC) I27.20    Chronic obstructive pulmonary disease (HCC) J44.9    History of benzodiazepine use Z87.898    COVID-19 vaccine series completed Z92.29     Family History   Problem Relation Age of Onset    Heart Attack Mother     Stroke Father       Social History     Tobacco Use    Smoking status: Never Smoker    Smokeless tobacco: Never Used Substance Use Topics    Alcohol use: Yes     Alcohol/week: 0.8 standard drinks     Types: 1 drink(s) per week     Comment: rare     No Known Allergies   Current Outpatient Medications   Medication Sig    sertraline (ZOLOFT) 100 mg tablet Take 1 Tablet by mouth daily.  lisinopriL (PRINIVIL, ZESTRIL) 40 mg tablet Take 1 Tablet by mouth daily for 180 days.  hydroCHLOROthiazide (HYDRODIURIL) 25 mg tablet Take 1 Tablet by mouth daily. Indications: high blood pressure    diclofenac (VOLTAREN) 1 % gel Apply  to affected area four (4) times daily.  vit A,C,E-zinc-copper (PreserVision AREDS) cap capsule Take 1 Capsule by mouth daily.  cholecalciferol, VITAMIN D3, (VITAMIN D3) 5,000 unit tab tablet Take 500 Units by mouth daily. Indications: prevention of vitamin D deficiency    cyanocobalamin 1,000 mcg tablet Take 1,000 mcg by mouth daily. Indications: prevention of vitamin B12 deficiency     No current facility-administered medications for this visit. LAB DATA REVIEWED:     Lab Results   Component Value Date/Time    Hemoglobin A1c 5.7 04/08/2015 01:40 PM     No results found for: MCACR, MCA1, MCA2, MCA3, MCAU, MCAU2, MCALPOCT  No results found for: TSH, TSH2, TSH3, TSHP, TSHEXT, TSHEXT  No results found for: SAM Burch      Lab Results   Component Value Date/Time    WBC 10.9 11/27/2021 12:32 AM    HGB 11.6 11/27/2021 12:32 AM    PLATELET 481 95/46/9584 12:32 AM     Lab Results   Component Value Date/Time    Sodium 135 (L) 11/27/2021 12:32 AM    Potassium 4.4 11/27/2021 12:32 AM    Chloride 102 11/27/2021 12:32 AM    CO2 26 11/27/2021 12:32 AM    BUN 20 11/27/2021 12:32 AM    Creatinine 0.83 11/27/2021 12:32 AM    Calcium 9.6 11/27/2021 12:32 AM      Lab Results   Component Value Date/Time    Alk.  phosphatase 92 11/27/2021 12:32 AM    Protein, total 8.0 11/27/2021 12:32 AM    Albumin 3.0 (L) 11/27/2021 12:32 AM    Globulin 5.0 (H) 11/27/2021 12:32 AM     No results found for: IRON, FE, TIBC, IBCT, PSAT, FERR             [facility], NP

## 2022-03-25 ENCOUNTER — DOCUMENTATION ONLY (OUTPATIENT)
Dept: FAMILY MEDICINE CLINIC | Age: 87
End: 2022-03-25

## 2022-03-25 NOTE — PROGRESS NOTES
Home Based Primary Care  Plan of Care    [facility] [place] Team Members: Saul Vazquez MD; Baljinder Carnes NP; Tasha Su NP; Radha Doss, RN; Rob Arcos, RN; Felisa Palmer RN; MARLIN Daly First  10/17/1922 / [id number]  female    The physician has reviewed and discussed the plan of care with the interdisciplinary group on 04/05/22 and agrees. Date of Initial Visit (Start of Care): 11/23/21    Diagnosis:   Patient Active Problem List   Diagnosis Code    Essential hypertension I10    Osteoarthritis M19.90    Major depressive disorder with single episode, in remission ([facility] F32.5    Chronic heart failure with preserved ejection fraction (HCC) I50.32    At high risk for falls Z91.81    Impaired mobility Z74.09    Pulmonary hypertension (HCC) I27.20    Chronic obstructive pulmonary disease (HCC) J44.9    History of benzodiazepine use Z87.898    COVID-19 vaccine series completed Z92.29       Patient status summary: 80 y.o. female who was referred to the [facility] ([place] [place] program due to impaired mobility with history of falls. Patient discussed today for initial POC review. Advance Care Planning:  Code status: No Order      Primary Decision Maker (Active): Kamini Shearer Child - [phone]   Advance Care Planning 11/19/2021   Patient's Healthcare Decision Maker is: Legal Next of Kin   Primary Decision Maker Name -   Primary Decision Maker Phone Number -   Primary Decision Maker Relationship to Patient -   Confirm Advance Directive Yes, not on file       DME/Supplies:  Other Cane     No Known Allergies    Nutritional Requirements:   Oral with supported meal preparation    Functional/Activity Level:  Ambulatory with assistance of cane, walker, or support of another person (significant impairment)    Safety Measures:   Fall risk    Acuity Level Rating: Medium    Current Outpatient Medications   Medication Sig    sertraline (ZOLOFT) 100 mg tablet Take 1 Tablet by mouth daily.     lisinopriL (PRINIVIL, ZESTRIL) 40 mg tablet Take 1 Tablet by mouth daily for 180 days.  hydroCHLOROthiazide (HYDRODIURIL) 25 mg tablet Take 1 Tablet by mouth daily. Indications: high blood pressure    diclofenac (VOLTAREN) 1 % gel Apply  to affected area four (4) times daily.  vit A,C,E-zinc-copper (PreserVision AREDS) cap capsule Take 1 Capsule by mouth daily.  cholecalciferol, VITAMIN D3, (VITAMIN D3) 5,000 unit tab tablet Take 500 Units by mouth daily. Indications: prevention of vitamin D deficiency    cyanocobalamin 1,000 mcg tablet Take 1,000 mcg by mouth daily. Indications: prevention of vitamin B12 deficiency     No current facility-administered medications for this visit. The Plan of Care has been initiated for during discussion at interdisciplinary group meeting  and reviewed and updated by the Interdisciplinary Group (IDG) as frequently as the patient condition warrants. Plan of Care by Discipline:    1. Provider  Ongoing evaluation of treatment interventions for specific disease state, Determine need for laboratory assessment based on patient disease status , Create and implement disease /symptom specific plan to manage high risk conditions / symptoms, Enhance visit frequency to monitor high risk health care needs and Initiate conversation regarding health care wishes     2. Nursing  Coordination of care with specialty services, Education regarding disease state, Education for safety; falls and Education for skin care in patients with limited mobility; with focus on preventing skin breakdown    3.  Social Work  Assess safety concerns and address as appropriate, Assess for caregiver burden and intervene as psychosocially indicated, Assess patient for caregiver needs to optimize psychosocial function and safety, Provide information on available community resources and Assess current Advance Care Planning documents and make ACP recommendations as appropriate      Plan of Care Orders / Action Items:    -HTN: BP has been high at previous visits but just within goal <150/90 at last visit. She had not taken BP medications yet that morning. Will treat conservatively due to her high fall risk and age. Continue HCTZ and lisinopril. Next follow-up scheduled for an afternoon visit, so expect we will get a better picture of her BP after medication at that time. History of falls/High fall risk- Last fall Nov 2021 with ED visit- no acute injuries or fractures noted at that time. Weakness of BLE. She continues to work with [address] [facility] once weekly and finds this helpful. Has walker and wheelchair in home but ambulates primarily with a cane. -HFpEF:  Stable. Appears euvolemic on exam.  Continue HCTZ and lisinopril. -COPD: No medications at this time. No recent exacerbations or symptoms, breathing comfortably on exam.  -Depression: Reports mood is stable. States \"I don't worry about anything. \" continue zoloft. 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 She continues to complain of pain 5/10 in lower back that is at her baseline. She uses Voltaren gel. Continues to work with PT once weekly. She is taking Canna-LS, which is CBD supplements. Daughter states she has discussed that it is not regulated and is expensive, but this is non-negotiable to patient and feels it is helpful. Advised caution with drowsiness and falls.      We discussed labwork (previous results stable with low albumin, encouraged protein in diet).   Discussed that she is eligible for shingles vaccine and Tdap, but these are not available for mobile services.       Advanced Medical Directives Discussion:   Daughter, Earnestine Awad, states that she has completed AMD with a , verified that it includes medical wishes and is not just financial.  She was unable to locate a copy during visit but states she will try to find it, and if unable will contact  for another copy.  Discussed general medical wishes with patient, daughter was present for conversation. Patient states that she would like to go to the hospital for potentially reversible events such as falls with fracture, infections needing IV antibiotics. We discussed her thoughts on code status and she stated she \"wouldn't want all of that. \"  Confirmed if she were found without a pulse or breathing she would not want CPR or defibrillation. She had a sister that passed away at 80 in her sleep. Asked daughter if this is what she had stated in her wishes before and she does not remember, but thinks it sounds like what her mother would want. Discussed the purpose of a DDNR and patient would like to read it and consider further, but feels she will most likely want to sign at next visit.   Copy left in home for review.         Health Maintenance   Topic Date Due    DTaP/Tdap/Td series (1 - Tdap) Never done    Shingrix Vaccine Age 50> (1 of 2) Never done    Bone Densitometry (Dexa) Screening  01/19/2023 (Originally 10/17/1987)    Medicare Yearly Exam  01/20/2023    Depression Monitoring  03/23/2023    Flu Vaccine  Completed    COVID-19 Vaccine  Completed    Pneumococcal 65+ years  Completed         Estimated Visit Frequency:  Every 2 month Provider Visit  Last MD visit: 3/23/22  SW visits PRN

## 2022-04-27 ENCOUNTER — TELEPHONE (OUTPATIENT)
Dept: FAMILY MEDICINE CLINIC | Age: 87
End: 2022-04-27

## 2022-04-27 NOTE — TELEPHONE ENCOUNTER
Bethanie Riddle with [facility] is calling to advise the medical record form they sent over can not be changed to Inocencia Amaro because she is not enrolled in [place].   # [id number]

## 2022-04-28 NOTE — TELEPHONE ENCOUNTER
Spoke with Sadie Keys and provided Renee Gu NPI and then she was able to search and find the [address] number. She will resubmit paperwork with corrected provider for signature.

## 2022-05-06 ENCOUNTER — TELEPHONE (OUTPATIENT)
Dept: FAMILY MEDICINE CLINIC | Age: 87
End: 2022-05-06

## 2022-05-06 NOTE — TELEPHONE ENCOUNTER
LCSW called pt's dtr, Ortega Luo, to schedule routine in-home psychosocial assessment and supportive counseling. No answer. LCSW will continue efforts to reach dtr and schedule appointment.      MEHRDAD Bran, [facility]    [address]  (a) [phone]

## 2022-05-17 ENCOUNTER — HOME VISIT (OUTPATIENT)
Dept: FAMILY MEDICINE CLINIC | Age: 87
End: 2022-05-17
Payer: MEDICARE

## 2022-05-17 VITALS
HEART RATE: 73 BPM | OXYGEN SATURATION: 95 % | DIASTOLIC BLOOD PRESSURE: 84 MMHG | TEMPERATURE: 97.9 F | SYSTOLIC BLOOD PRESSURE: 144 MMHG

## 2022-05-17 DIAGNOSIS — H61.23 BILATERAL IMPACTED CERUMEN: ICD-10-CM

## 2022-05-17 DIAGNOSIS — I10 ESSENTIAL HYPERTENSION: Primary | ICD-10-CM

## 2022-05-17 DIAGNOSIS — Z71.89 ADVANCED DIRECTIVES, COUNSELING/DISCUSSION: ICD-10-CM

## 2022-05-17 DIAGNOSIS — Z74.09 IMPAIRED MOBILITY: ICD-10-CM

## 2022-05-17 DIAGNOSIS — I50.32 CHRONIC HEART FAILURE WITH PRESERVED EJECTION FRACTION (HCC): ICD-10-CM

## 2022-05-17 DIAGNOSIS — Z91.81 AT HIGH RISK FOR FALLS: ICD-10-CM

## 2022-05-17 DIAGNOSIS — F32.5 MAJOR DEPRESSIVE DISORDER WITH SINGLE EPISODE, IN REMISSION (HCC): ICD-10-CM

## 2022-05-17 DIAGNOSIS — M19.90 OSTEOARTHRITIS, UNSPECIFIED OSTEOARTHRITIS TYPE, UNSPECIFIED SITE: ICD-10-CM

## 2022-05-17 DIAGNOSIS — J44.9 CHRONIC OBSTRUCTIVE PULMONARY DISEASE, UNSPECIFIED COPD TYPE (HCC): ICD-10-CM

## 2022-05-17 PROCEDURE — 99350 HOME/RES VST EST HIGH MDM 60: CPT | Performed by: NURSE PRACTITIONER

## 2022-05-17 NOTE — PROGRESS NOTES
Home Based [address] [facility]   [id number]         NOTE: Home Based Primary Care is a PROVIDER (MD/NP) based interdisciplinary practice (RN, LCSW, Pharmacy, Dietician) for patients who cannot access (or have a taxing effort) primary / specialty medical care in an office setting. [facility] is NOT [employer] but works with [facility] when there is a skilled need. Our MD/NPs are integral in Care Transitions; PLEASE CALL [phone] this patient arrives in the Emergency Department or Hospital.           Date of Current Visit: 5/17/2022      SUMMARY:   Carolina Robertson is a 80 y.o. F, established patient of [facility] seen today in the home due to taxing effort to seek primary care services in the community s/t impaired mobility, high fall risk. History is supplemented by daughter who is present during visit. ASSESSMENT AND PLAN       ICD-10-CM ICD-9-CM    1. Essential hypertension  I10 401.9    2. At high risk for falls  Z91.81 V15.88    3. Impaired mobility  Z74.09 799.89    4. Chronic heart failure with preserved ejection fraction (HCC)  I50.32 428.9    5. Chronic obstructive pulmonary disease, unspecified COPD type ([facility]  J44.9 496    6. Osteoarthritis, unspecified osteoarthritis type, unspecified site  M19.90 715.90    7. Major depressive disorder with single episode, in remission ([facility]  F32.5 296.25    8. Advanced directives, counseling/discussion  Z71.89 V65.49    9. Bilateral impacted cerumen  H61.23 380.4      -HTN: Goal <150/90- slightly above goal at start of visit at 152/86, improves to 144/84 by end of visit. Patient has not taken BP medications yet (takes during visit). Will treat conservatively due to her high fall risk and age. Continue HCTZ and lisinopril.    -History of falls/impaired mobility- Last fall Nov 2021 with ED visit- no acute injuries or fractures noted at that time. Weakness of BLE. She continues to work with [facility] once weekly.   Has walker and wheelchair in home but ambulates primarily with a cane (ambulately slowly but with good balance during visit)  -HFpEF:  Stable. Appears euvolemic on exam today. Continue HCTZ and lisinopril. -COPD: No medications at this time. No recent exacerbations or symptoms, breathing comfortably on exam today with lungs CTAB. -Depression: Reports mood is stable. Daughter confirms she mostly has good days, occasional irritability. Continue sertraline 25mg daily.  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 Reports occasional pain  5/10 in lower back that is her baseline. Denies pain at visit today. She uses Voltaren gel and PRN Tylenol. Continues to work with PT once weekly. She is taking Canna-LS, CBD supplements. Daughter states she has discussed that it is not regulated, but this is non-negotiable to patient and feels it is helpful. Advised caution with drowsiness and falls. Advanced Medical Directives Discussion:   Daughter, Leticia Ford, states that she has completed AMD with a , verified that it includes medical wishes and is not just financial.  She was unable to locate a copy during last visit but states she will try to find it, and if unable will contact  for another copy. Discussed general medical wishes with patient in the presence of daughter at previous visit. Patient states that she would like to go to the hospital for potentially reversible events such as falls with fracture, infections needing IV antibiotics. We discussed her thoughts on code status and she stated she \"wouldn't want all of that. \"  Confirmed if she were found without a pulse or breathing she would not want CPR or defibrillation. DDNR completed during visit         -Cerumen impaction: Significant cerumen buildup bilaterally, unable to visualize TM.   Due to chronic nature of hearing loss, suspect sensorineural vs conductive hearing loss, but certainly this can contribute. Advised to start Debrox drops and will consider ear irrigation at next visit if patient interested      Follow up: in 2 months with NP (7/19/22). Will need to follow-up on cerumen, consider ear irrigation   Patient/family was provided a paper after visit summary prior to conclusion of visit. HISTORY:      CHIEF COMPLAINT:    Chief Complaint   Patient presents with    Follow Up Chronic Condition    Hypertension       HPI/SUBJECTIVE:    The patient is: [x] Verbal / [] Nonverbal     Ms. Sabiha David is a 81 y/o F seen today for follow-up visit of chronic medical conditions. She lives at home with her daughter, Tamie Gomez. Patient continues to manage her own medications (lines up pill bottles and then checks off in notebook when she has taken). She is continuing to work with PT once weekly, but daughter states therapist plans to discharge in the next few weeks. She is ambulating in the home mostly with a cane, and has had no falls since November 2021. Tamie Gomez is continuing to help her around the home, but feels that she needs additional care for her mother. Tamie Gomez has been somewhat overwhelmed recently with the care of her mother and needing additional time for errands, appointments. Discussed this with team  who will reach out to patient to discuss options. At visit today, patient is awake and ambulates to the kitchen for visit. Her daughter states she had a difficult morning and just got out of bed around noon. Due to her late start, she has not taken medications yet this morning and she takes them during visit. Patient is very hard of hearing, but answers questions appropriately. She is alert and oriented to self, place, and date. She knows that today is her wedding anniversary (daughter confirms). She is sleeping well and has a good appetite. She admits to frequent snacking, but is typically also eating at least 2 meals per day.   Her mood has been stable recently. She reports most days she feels happy, and her daughter confirms this, but states she can be irritable at times. Her chronic medical conditions include hypertension, COPD, and arthritis. Her blood pressure is above goal of <150/90 at beginning of visit, but she is just taking her daily medications which include lisinopril and HCTZ. Her COPD is well-controlled without any current medications. Her arthritis is primarily localized to her knees and back, but she reports no pain at visit today. She states that PT is helping her with the pain, and she is currently taking Tylenol and a CBD supplement. Ms. Jacqueline Washington is very hard of hearing. Per daughter, she has hearing aids in the home but patient does not wear them. Ears examined to reveal large amount of cerumen in canal, unable to visualize tympanic membrane bilaterally. Discussed using Debrox in bilateral ears and can consider ear irrigation at next visit if no improvement. At last visit, initiated conversation regarding advanced directives. Patient expressed her healthcare wishes in presence of daughter and stated she would not want CPR or defibrillation. Daughter was in agreement, kept copy of DDNR for review. She has this paperwork for visit today and would like to complete. We completed at this visit (patient was able to express her wishes and sign for herself). Daughter will keep in safe and easily accessible place. REVIEW OF SYSTEMS:     Review of Systems   Constitutional: Negative for chills, fever, malaise/fatigue and weight loss. HENT: Positive for hearing loss. Negative for congestion, ear discharge, ear pain and sore throat. Eyes: Negative for blurred vision, discharge and redness. Respiratory: Negative for cough, shortness of breath and wheezing. Cardiovascular: Negative for chest pain, palpitations and leg swelling.    Gastrointestinal: Negative for abdominal pain, blood in stool, constipation, diarrhea, nausea and vomiting. Genitourinary: Negative for dysuria and frequency. Musculoskeletal: Positive for back pain and joint pain. Negative for falls. Skin: Negative for itching and rash. Neurological: Positive for weakness. Negative for dizziness, speech change and headaches. Psychiatric/Behavioral: Negative for depression and memory loss. The patient is not nervous/anxious and does not have insomnia. PHYSICAL EXAM:     Visit Vitals  BP (!) 144/84   Pulse 73   Temp 97.9 °F (36.6 °C) (Temporal)   SpO2 95%       Physical Exam  Constitutional:       General: She is not in acute distress. Appearance: She is not toxic-appearing. Comments: Thin, well-dressed, well-groomed female   HENT:      Head: Normocephalic and atraumatic. Right Ear: External ear normal. Decreased hearing noted. There is impacted cerumen. Left Ear: External ear normal. Decreased hearing noted. There is impacted cerumen. Ears:      Comments: Very hard of hearing. Has hearing aids but does not wear     Nose: Nose normal. No rhinorrhea. Mouth/Throat:      Mouth: Mucous membranes are moist.      Pharynx: Oropharynx is clear. Comments: Wearing lower dentures only  Eyes:      General:         Right eye: No discharge. Left eye: No discharge. Extraocular Movements: Extraocular movements intact. Conjunctiva/sclera: Conjunctivae normal.      Pupils: Pupils are equal, round, and reactive to light. Neck:      Vascular: No carotid bruit. Cardiovascular:      Rate and Rhythm: Normal rate and regular rhythm. Pulses: Normal pulses. Heart sounds: Normal heart sounds. No murmur heard. No gallop. Pulmonary:      Effort: Pulmonary effort is normal. No respiratory distress. Breath sounds: Normal breath sounds. No wheezing or rhonchi. Abdominal:      General: Bowel sounds are normal. There is no distension. Palpations: Abdomen is soft. There is no mass. Tenderness:  There is no abdominal tenderness. There is no guarding. Musculoskeletal:         General: No deformity. Right lower leg: No edema. Left lower leg: No edema. Skin:     General: Skin is warm and dry. Capillary Refill: Capillary refill takes less than 2 seconds. Findings: No bruising or rash. Neurological:      General: No focal deficit present. Mental Status: She is alert and oriented to person, place, and time. Comments: Generalized weakness. Psychiatric:         Mood and Affect: Mood normal.         Behavior: Behavior normal.         Thought Content: Thought content normal.        HISTORY:     Past Medical and Surgical History reviewed in [facility] on date of visit    Patient Active Problem List   Diagnosis Code    Essential hypertension I10    Osteoarthritis M19.90    Major depressive disorder with single episode, in remission ([facility]) F32.5    Chronic heart failure with preserved ejection fraction ([facility]) I50.32    At high risk for falls Z91.81    Impaired mobility Z74.09    Pulmonary hypertension ([facility]) I27.20    Chronic obstructive pulmonary disease ([facility]) J44.9    History of benzodiazepine use Z87.898    COVID-19 vaccine series completed Z92.29     Family History   Problem Relation Age of Onset    Heart Attack Mother     Stroke Father       Social History     Tobacco Use    Smoking status: Never Smoker    Smokeless tobacco: Never Used   Substance Use Topics    Alcohol use: Yes     Alcohol/week: 0.8 standard drinks     Types: 1 drink(s) per week     Comment: rare     No Known Allergies   Current Outpatient Medications   Medication Sig    sertraline (ZOLOFT) 100 mg tablet Take 1 Tablet by mouth daily.  lisinopriL (PRINIVIL, ZESTRIL) 40 mg tablet Take 1 Tablet by mouth daily for 180 days.  hydroCHLOROthiazide (HYDRODIURIL) 25 mg tablet Take 1 Tablet by mouth daily.  Indications: high blood pressure    diclofenac (VOLTAREN) 1 % gel Apply  to affected area four (4) times daily.  vit A,C,E-zinc-copper (PreserVision AREDS) cap capsule Take 1 Capsule by mouth daily. No current facility-administered medications for this visit. LAB DATA REVIEWED:     Lab Results   Component Value Date/Time    Hemoglobin A1c 5.7 04/08/2015 01:40 PM     No results found for: MCACR, MCA1, MCA2, MCA3, MCAU, MCAU2, MCALPOCT  No results found for: TSH, TSH2, TSH3, TSHP, TSHEXT, TSHEXT  No results found for: Emil Crooked, VD3RIA      Lab Results   Component Value Date/Time    WBC 10.9 11/27/2021 12:32 AM    HGB 11.6 11/27/2021 12:32 AM    PLATELET 351 16/70/9844 12:32 AM     Lab Results   Component Value Date/Time    Sodium 135 (L) 11/27/2021 12:32 AM    Potassium 4.4 11/27/2021 12:32 AM    Chloride 102 11/27/2021 12:32 AM    CO2 26 11/27/2021 12:32 AM    BUN 20 11/27/2021 12:32 AM    Creatinine 0.83 11/27/2021 12:32 AM    Calcium 9.6 11/27/2021 12:32 AM      Lab Results   Component Value Date/Time    Alk. phosphatase 92 11/27/2021 12:32 AM    Protein, total 8.0 11/27/2021 12:32 AM    Albumin 3.0 (L) 11/27/2021 12:32 AM    Globulin 5.0 (H) 11/27/2021 12:32 AM     No results found for: IRON, FE, TIBC, IBCT, PSAT, FERR           On this date 5/17/2022  I have spent 60 minutes reviewing previous notes, test results, and face to face with the patient discussing the diagnosis, health maintenance, medication counseling and importance of compliance with the treatment plan as well as documenting on the day of the visit.     Leonardo Casper NP

## 2022-05-18 NOTE — PATIENT INSTRUCTIONS
Earwax Blockage: Care Instructions  Your Care Instructions     Earwax is a natural substance that protects the ear canal. Normally, earwax drains from the ears and does not cause problems. Sometimes earwax builds up and hardens. Earwax blockage (also called cerumen impaction) can cause some loss of hearing and pain. When wax is tightly packed, you will need to have your doctor remove it. Follow-up care is a key part of your treatment and safety. Be sure to make and go to all appointments, and call your doctor if you are having problems. It's also a good idea to know your test results and keep a list of the medicines you take. How can you care for yourself at home? · Do not try to remove earwax with cotton swabs, fingers, or other objects. This can make the blockage worse and damage the eardrum. · If your doctor recommends that you try to remove earwax at home:  ? Soften and loosen the earwax with warm mineral oil. You also can try hydrogen peroxide mixed with an equal amount of room temperature water. Place 2 drops of the fluid, warmed to body temperature, in the ear two times a day for up to 5 days. ? Once the wax is loose and soft, all that is usually needed to remove it from the ear canal is a gentle, warm shower. Direct the water into the ear, then tip your head to let the earwax drain out. Dry your ear thoroughly with a hair dryer set on low. Hold the dryer several inches from your ear. ? If the warm mineral oil and shower do not work, use an over-the-counter wax softener. Read and follow all instructions on the label. After using the wax softener, use an ear syringe to gently flush the ear. Make sure the flushing solution is body temperature. Cool or hot fluids in the ear can cause dizziness. When should you call for help? Call your doctor now or seek immediate medical care if:    · Pus or blood drains from your ear.     · Your ears are ringing or feel full.     · You have a loss of hearing. Watch closely for changes in your health, and be sure to contact your doctor if:    · You have pain or reduced hearing after 1 week of home treatment.     · You have any new symptoms, such as nausea or balance problems. Where can you learn more? Go to http://www.gray.com/  Enter Q495 in the search box to learn more about \"Earwax Blockage: Care Instructions. \"  Current as of: July 1, 2021               Content Version: 13.2  © 2006-2022 [employer]. Care instructions adapted under license by [employer] (which disclaims liability or warranty for this information). If you have questions about a medical condition or this instruction, always ask your healthcare professional. [address] any warranty or liability for your use of this information.

## 2022-05-20 ENCOUNTER — DOCUMENTATION ONLY (OUTPATIENT)
Dept: FAMILY MEDICINE CLINIC | Age: 87
End: 2022-05-20

## 2022-05-20 NOTE — PROGRESS NOTES
Home Based Primary Care  Plan of Care    [facility] Team Members: Srinath Johnson MD; Rafi Pollock NP; CRISTIAN Collazo; Brandan Rachel RN; Keyanna Howard, JEANNIE; Felisa Palmer RN; Zoe Conley LCSW    Aysezaire Garrett  10/17/1922 / [id number]  female    The physician has reviewed and discussed the plan of care with the interdisciplinary group on 05/24/22 and agrees. Date of Initial Visit (Start of Care): 11/23/21    Diagnosis:   Patient Active Problem List   Diagnosis Code    Essential hypertension I10    Osteoarthritis M19.90    Major depressive disorder with single episode, in remission ([facility] F32.5    Chronic heart failure with preserved ejection fraction (HCC) I50.32    At high risk for falls Z91.81    Impaired mobility Z74.09    Pulmonary hypertension (HCC) I27.20    Chronic obstructive pulmonary disease (HCC) J44.9    History of benzodiazepine use Z87.898    COVID-19 vaccine series completed Z92.29       Patient status summary: 80 y.o. female who was referred to the [facility] program due to impaired mobility with history of falls. Patient discussed today for initial POC review. Advance Care Planning:  Code status: No Order      Primary Decision Maker (Active): Newton Malcolm [facility] [[email or web address]] - [phone]   Advance Care Planning 11/19/2021   Patient's Healthcare Decision Maker is: Legal Next of Kin   Primary Decision Maker Name -   Primary Decision Maker Phone Number -   Primary Decision Maker Relationship to Patient -   Confirm Advance Directive Yes, not on file       DME/Supplies:  Other Cane     No Known Allergies    Nutritional Requirements:   Oral with supported meal preparation    Functional/Activity Level:  Ambulatory with assistance of cane, walker, or support of another person (significant impairment)    Safety Measures:   Fall risk    Acuity Level Rating: Medium    Current Outpatient Medications   Medication Sig    sertraline (ZOLOFT) 100 mg tablet Take 1 Tablet by mouth daily.     lisinopriL (PRINIVIL, ZESTRIL) 40 mg tablet Take 1 Tablet by mouth daily for 180 days.  hydroCHLOROthiazide (HYDRODIURIL) 25 mg tablet Take 1 Tablet by mouth daily. Indications: high blood pressure    diclofenac (VOLTAREN) 1 % gel Apply  to affected area four (4) times daily.  vit A,C,E-zinc-copper (PreserVision AREDS) cap capsule Take 1 Capsule by mouth daily. No current facility-administered medications for this visit. The Plan of Care has been initiated for during discussion at interdisciplinary group meeting  and reviewed and updated by the Interdisciplinary Group (IDG) as frequently as the patient condition warrants. Plan of Care by Discipline:    1. Provider  Ongoing evaluation of treatment interventions for specific disease state, Determine need for laboratory assessment based on patient disease status , Create and implement disease /symptom specific plan to manage high risk conditions / symptoms, Enhance visit frequency to monitor high risk health care needs and Initiate conversation regarding health care wishes     2. Nursing  Coordination of care with specialty services, Education regarding disease state, Education for safety; falls and Education for skin care in patients with limited mobility; with focus on preventing skin breakdown    3. Social Work  Assess safety concerns and address as appropriate, Assess for caregiver burden and intervene as psychosocially indicated, Assess patient for caregiver needs to optimize psychosocial function and safety, Provide information on available community resources and Assess current Advance Care Planning documents and make ACP recommendations as appropriate      Plan of Care Orders / Action Items:  -HTN: Goal <150/90- slightly above goal at start of visit at 152/86, improves to 144/84 by end of visit. Patient had not taken BP medications prior to first BP check, (takes during visit). Will treat conservatively due to her high fall risk and age.   Continue HCTZ and lisinopril.    -History of falls/impaired mobility- Last fall Nov 2021 with ED visit- no acute injuries or fractures noted at that time. Weakness of BLE. She continues to work with [facility] once weekly. Has walker and wheelchair in home but ambulates primarily with a cane (ambulately slowly but with good balance during visit)  -HFpEF:  Stable. Appears euvolemic on exam (5/17/22). Continue HCTZ and lisinopril. -COPD: No medications at this time. No recent exacerbations or symptoms, breathing comfortably during exam with lungs CTAB. -Depression: Reports mood is stable. Daughter confirms she mostly has good days, occasional irritability. Continue sertraline 25mg daily.  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 Reports occasional pain  5/10 in lower back that is her baseline. Denies pain at visit (5/17/22). She uses Voltaren gel and PRN Tylenol. Continues to work with PT once weekly. She is taking Canna-LS, CBD supplements. Daughter states she has discussed that it is not regulated, but this is non-negotiable to patient and feels it is helpful. Advised caution with drowsiness and falls.    -Advanced Medical Directives Discussion:   Daughter, Keith Saldivar, states that she has completed AMD with a , verified that it includes medical wishes and is not just financial.  She was unable to locate a copy during last visit but states she will try to find it, and if unable will contact  for another copy. Discussed general medical wishes with patient in the presence of daughter at previous visit. Patient states that she would like to go to the hospital for potentially reversible events such as falls with fracture, infections needing IV antibiotics. We discussed her thoughts on code status and she stated she \"wouldn't want all of that. \"  Confirmed if she were found without a pulse or breathing she would not want CPR or defibrillation. DDNR completed during visit   -Cerumen impaction: Significant cerumen buildup bilaterally, unable to visualize TM. Due to chronic nature of hearing loss, suspect sensorineural vs conductive hearing loss, but certainly this can contribute. Advised to start Debrox drops and will consider ear irrigation at next visit if patient interested     Follow up: in 2 months with NP (7/19/22).   Will need to follow-up on cerumen, consider ear irrigation     Health Maintenance   Topic Date Due    DTaP/Tdap/Td series (1 - Tdap) Never done    Shingrix Vaccine Age 50> (1 of 2) Never done    Bone Densitometry (Dexa) Screening  01/19/2023 (Originally 10/17/1987)    Medicare Yearly Exam  01/20/2023    Depression Monitoring  05/17/2023    Flu Vaccine  Completed    COVID-19 Vaccine  Completed    Pneumococcal 65+ years  Completed         Estimated Visit Frequency:  Every 2 month Provider Visit   Last NP visit: 5/17/22  Last MD visit: 3/23/22  SW visits PRN

## 2022-05-24 ENCOUNTER — TELEPHONE (OUTPATIENT)
Dept: FAMILY MEDICINE CLINIC | Age: 87
End: 2022-05-24

## 2022-05-24 NOTE — TELEPHONE ENCOUNTER
LCSW called pt's dtr, Misa Ricks, to discuss caregiver resource options. No answer. LCSW left voicemail, provided contact information, and encouraged a call back at her convenience.      MEHRDAD Moy, [facility]    [address]  (y) [phone]

## 2022-06-05 DIAGNOSIS — F32.5 MAJOR DEPRESSIVE DISORDER WITH SINGLE EPISODE, IN REMISSION (HCC): ICD-10-CM

## 2022-06-05 DIAGNOSIS — I10 ESSENTIAL HYPERTENSION: ICD-10-CM

## 2022-06-07 RX ORDER — LISINOPRIL 40 MG/1
TABLET ORAL
Qty: 90 TABLET | Refills: 1 | Status: SHIPPED | OUTPATIENT
Start: 2022-06-07

## 2022-06-07 RX ORDER — HYDROCHLOROTHIAZIDE 25 MG/1
TABLET ORAL
Qty: 90 TABLET | Refills: 1 | Status: SHIPPED | OUTPATIENT
Start: 2022-06-07

## 2022-06-07 RX ORDER — SERTRALINE HYDROCHLORIDE 100 MG/1
TABLET, FILM COATED ORAL
Qty: 90 TABLET | Refills: 1 | Status: SHIPPED | OUTPATIENT
Start: 2022-06-07

## 2022-06-13 ENCOUNTER — TELEPHONE (OUTPATIENT)
Dept: FAMILY MEDICINE CLINIC | Age: 87
End: 2022-06-13

## 2022-06-13 NOTE — TELEPHONE ENCOUNTER
KELLYW rec'd voicemail message from pt's dtr, Huma Garza ([phone]). LCSW called her back. She stated she would like this LCSW and a nurse from the [facility] team to [facility] out and evaluate mom\". KELLYW asked clarifying questions. Huma Garza stated that her mother has been eating less, sleeping most of the day but awake at night, and now is not letting [employer] bathe her or assist her with ADLs. She is looking for help. KELLYW inquired about applying for Medicaid. Abimaelroxanne Garza states that she plans to apply for Medicaid for mom but hasn't done so yet. She has the phone number for the state hotline to apply. Mom does not have a long term care insurance plan. Abimaelroxanne Garza asked if mom would qualify for hospice. MARLIN stated that this is something that the medical provider can assess for, and determine if a hospice consult would be appropriate. MARLIN has IDT meeting tomorrow morning, and will raise this question with the [facility] team. MARLIN will call Abimaelroxanne Garza back tomorrow after speaking with the team re: hospice eligibility.      Valentino Berliner, MSW, [place]    [address]  (z) [phone]

## 2022-06-15 ENCOUNTER — HOME VISIT (OUTPATIENT)
Dept: FAMILY MEDICINE CLINIC | Age: 87
End: 2022-06-15
Payer: MEDICARE

## 2022-06-15 VITALS
SYSTOLIC BLOOD PRESSURE: 146 MMHG | DIASTOLIC BLOOD PRESSURE: 84 MMHG | HEART RATE: 70 BPM | OXYGEN SATURATION: 96 % | TEMPERATURE: 99 F | RESPIRATION RATE: 18 BRPM

## 2022-06-15 DIAGNOSIS — Z13.29 THYROID DISORDER SCREEN: ICD-10-CM

## 2022-06-15 DIAGNOSIS — Z71.89 ADVANCED DIRECTIVES, COUNSELING/DISCUSSION: ICD-10-CM

## 2022-06-15 DIAGNOSIS — R41.82 ALTERED MENTAL STATUS, UNSPECIFIED ALTERED MENTAL STATUS TYPE: ICD-10-CM

## 2022-06-15 DIAGNOSIS — I10 ESSENTIAL HYPERTENSION: ICD-10-CM

## 2022-06-15 DIAGNOSIS — R41.82 ALTERED MENTAL STATUS, UNSPECIFIED ALTERED MENTAL STATUS TYPE: Primary | ICD-10-CM

## 2022-06-15 DIAGNOSIS — R40.4 TRANSIENT ALTERATION OF AWARENESS: Primary | ICD-10-CM

## 2022-06-15 DIAGNOSIS — Z91.81 AT HIGH RISK FOR FALLS: ICD-10-CM

## 2022-06-15 LAB
ALBUMIN SERPL-MCNC: 3.8 G/DL (ref 3.5–5)
ALBUMIN/GLOB SERPL: 1 {RATIO} (ref 1.1–2.2)
ALP SERPL-CCNC: 113 U/L (ref 45–117)
ALT SERPL-CCNC: 11 U/L (ref 12–78)
ANION GAP SERPL CALC-SCNC: 6 MMOL/L (ref 5–15)
APPEARANCE UR: ABNORMAL
AST SERPL-CCNC: 10 U/L (ref 15–37)
BACTERIA URNS QL MICRO: ABNORMAL /HPF
BASOPHILS # BLD: 0 K/UL (ref 0–0.1)
BASOPHILS NFR BLD: 1 % (ref 0–1)
BILIRUB SERPL-MCNC: 0.3 MG/DL (ref 0.2–1)
BILIRUB UR QL: NEGATIVE
BUN SERPL-MCNC: 21 MG/DL (ref 6–20)
BUN/CREAT SERPL: 22 (ref 12–20)
CALCIUM SERPL-MCNC: 9.8 MG/DL (ref 8.5–10.1)
CHLORIDE SERPL-SCNC: 106 MMOL/L (ref 97–108)
CO2 SERPL-SCNC: 29 MMOL/L (ref 21–32)
COLOR UR: ABNORMAL
CREAT SERPL-MCNC: 0.94 MG/DL (ref 0.55–1.02)
DIFFERENTIAL METHOD BLD: ABNORMAL
EOSINOPHIL # BLD: 0.2 K/UL (ref 0–0.4)
EOSINOPHIL NFR BLD: 2 % (ref 0–7)
EPITH CASTS URNS QL MICRO: ABNORMAL /LPF
ERYTHROCYTE [DISTWIDTH] IN BLOOD BY AUTOMATED COUNT: 12.7 % (ref 11.5–14.5)
GLOBULIN SER CALC-MCNC: 3.9 G/DL (ref 2–4)
GLUCOSE SERPL-MCNC: 113 MG/DL (ref 65–100)
GLUCOSE UR STRIP.AUTO-MCNC: NEGATIVE MG/DL
HCT VFR BLD AUTO: 38.4 % (ref 35–47)
HGB BLD-MCNC: 12.5 G/DL (ref 11.5–16)
HGB UR QL STRIP: NEGATIVE
IMM GRANULOCYTES # BLD AUTO: 0 K/UL (ref 0–0.04)
IMM GRANULOCYTES NFR BLD AUTO: 0 % (ref 0–0.5)
KETONES UR QL STRIP.AUTO: NEGATIVE MG/DL
LEUKOCYTE ESTERASE UR QL STRIP.AUTO: ABNORMAL
LYMPHOCYTES # BLD: 1.7 K/UL (ref 0.8–3.5)
LYMPHOCYTES NFR BLD: 23 % (ref 12–49)
MCH RBC QN AUTO: 32.9 PG (ref 26–34)
MCHC RBC AUTO-ENTMCNC: 32.6 G/DL (ref 30–36.5)
MCV RBC AUTO: 101.1 FL (ref 80–99)
MONOCYTES # BLD: 0.8 K/UL (ref 0–1)
MONOCYTES NFR BLD: 10 % (ref 5–13)
NEUTS SEG # BLD: 4.9 K/UL (ref 1.8–8)
NEUTS SEG NFR BLD: 64 % (ref 32–75)
NITRITE UR QL STRIP.AUTO: POSITIVE
NRBC # BLD: 0 K/UL (ref 0–0.01)
NRBC BLD-RTO: 0 PER 100 WBC
PH UR STRIP: 6.5 [PH] (ref 5–8)
PLATELET # BLD AUTO: 321 K/UL (ref 150–400)
PMV BLD AUTO: 10.1 FL (ref 8.9–12.9)
POTASSIUM SERPL-SCNC: 3.9 MMOL/L (ref 3.5–5.1)
PROT SERPL-MCNC: 7.7 G/DL (ref 6.4–8.2)
PROT UR STRIP-MCNC: NEGATIVE MG/DL
RBC # BLD AUTO: 3.8 M/UL (ref 3.8–5.2)
RBC #/AREA URNS HPF: ABNORMAL /HPF (ref 0–5)
SODIUM SERPL-SCNC: 141 MMOL/L (ref 136–145)
SP GR UR REFRACTOMETRY: 1.02 (ref 1–1.03)
TSH SERPL DL<=0.05 MIU/L-ACNC: 3.22 UIU/ML (ref 0.36–3.74)
UA: UC IF INDICATED,UAUC: ABNORMAL
UROBILINOGEN UR QL STRIP.AUTO: 0.2 EU/DL (ref 0.2–1)
WBC # BLD AUTO: 7.7 K/UL (ref 3.6–11)
WBC URNS QL MICRO: ABNORMAL /HPF (ref 0–4)

## 2022-06-15 PROCEDURE — 99348 HOME/RES VST EST LOW MDM 30: CPT | Performed by: NURSE PRACTITIONER

## 2022-06-15 PROCEDURE — 1123F ACP DISCUSS/DSCN MKR DOCD: CPT | Performed by: NURSE PRACTITIONER

## 2022-06-15 NOTE — PATIENT INSTRUCTIONS
Insomnia: Care Instructions  Overview     Insomnia is the inability to sleep well. Insomnia may make it hard for you to get to sleep, stay asleep, or sleep as long as you need to. This can make you tired and grouchy during the day. It can also make you forgetful, less effective at work, and unhappy. Insomnia can be linked to many things. These include health problems, medicines, and stressful events. Treatment may include treating problems that may be linked with your insomnia. Treatment also includes behavior and lifestyle changes. This may include cognitive-behavioral therapy for insomnia (CBT-I). CBT-I uses different ways to help you change your thoughts and behaviors that may interfere with sleep. Your doctor can recommend specific things you can try. Examples include doing relaxation exercises, keeping regular bedtimes and wake times, limiting alcohol, and making healthy sleep habits. Some people decide to take medicine for a while to help with sleep. Follow-up care is a key part of your treatment and safety. Be sure to make and go to all appointments, and call your doctor if you are having problems. It's also a good idea to know your test results and keep a list of the medicines you take. How can you care for yourself at home? Cognitive-behavioral therapy for insomnia (CBT-I)  · If your doctor recommends CBT-I, follow your treatment plan. Your doctor will give you instructions that are unique for you. · Your plan will likely include a few things that you can try at home. For example:  ? Try meditation or other relaxation techniques before you go to bed. ? Go to bed at the same time every night, and wake up at the same time every morning. Do not take naps during the day. ? Do not stay in bed awake for too long.  If you can't fall asleep, or if you wake up in the middle of the night and can't get back to sleep within about 15 to 20 minutes, get out of bed and go to another room until you feel sleepy. ? If watching the clock makes you anxious, turn it facing away from you so you cannot see the time. ? If you worry when you lie down, start a worry book. Well before bedtime, write down your worries, and then set the book and your concerns aside. Healthy sleep habits  · If your doctor recommends it, try making healthy sleep habits. For example:  ? Keep your bedroom quiet, dark, and cool. ? Do not have drinks with caffeine, such as coffee or black tea, for 8 hours before bed. ? Do not smoke or use other types of tobacco near bedtime. Nicotine is a stimulant and can keep you awake. ? Avoid drinking alcohol late in the evening, because it can cause you to wake in the middle of the night. ? Do not eat a big meal close to bedtime. If you are hungry, eat a light snack. ? Do not drink a lot of water close to bedtime, because the need to urinate may wake you up during the night. ? Do not read, watch TV, or use your phone in bed. Use the bed only for sleeping and sex. Medicine  · Be safe with medicines. Take your medicines exactly as prescribed. Call your doctor if you think you are having a problem with your medicine. · Talk with your doctor before you try an over-the-counter medicine, herbal product, or supplement to try to improve your sleep. Your doctor can recommend how much to take and when to take it. Make sure your doctor knows all of the medicines, vitamins, herbal products, and supplements you take. · You will get more details on the specific medicines your doctor prescribes. When should you call for help? Watch closely for changes in your health, and be sure to contact your doctor if:    · Your efforts to improve your sleep do not work.     · Your insomnia gets worse.     · You have been feeling down, depressed, or hopeless or have lost interest in things that you usually enjoy. Where can you learn more?   Go to http://www.gray.com/  Enter P513 in the search box to learn more about \"Insomnia: Care Instructions. \"  Current as of: June 16, 2021               Content Version: 13.2  © 0453-3044 [employer], [employer]. Care instructions adapted under license by [employer] (which disclaims liability or warranty for this information). If you have questions about a medical condition or this instruction, always ask your healthcare professional. [address] any warranty or liability for your use of this information.

## 2022-06-15 NOTE — PROGRESS NOTES
Home Based [address] [facility]   [phone]         NOTE: Home Based Primary Care is a PROVIDER (MD/NP) based interdisciplinary practice (RN, LCSW, Pharmacy, Dietician) for patients who cannot access (or have a taxing effort) primary / specialty medical care in an office setting. [facility] is NOT [employer] but works with [facility] when there is a skilled need. Our MD/NPs are integral in Care Transitions; PLEASE CALL [phone] this patient arrives in the Emergency Department or Hospital.           Date of Current Visit: 6/15/2022      SUMMARY:   Carolina Robertson is a 80 y.o. F, established patient of [facility] seen today in the home due to taxing effort to seek primary care services in the community s/t impaired mobility, high fall risk. History is supplemented by daughter who is present during visit. ASSESSMENT AND PLAN       ICD-10-CM ICD-9-CM    1. Transient alteration of awareness  R40.4 780.02    2. Essential hypertension  I10 401.9    3. At high risk for falls  Z91.81 V15.88    4. Advanced directives, counseling/discussion  Z71.89 V65.49      -Altered Mental Status: Daughter reported to team SW that patient has seemed confused, she is unable to tell if patient is taking her medications or eating regularly and she is increasingly resistant to her daughter's care. She has episodes of urinary incontinence that are potentially worsening. She has disruption of her sleep/wake cycle and is sleeping longer in the mornings. MMSE completed today with score 25/30. No focal neurological deficits noted. Labs obtained including CBC, CMP, TSH, Urinalysis with reflex culture. -HTN: Goal <150/90- at goal today, patient has not taken daily medications as she has just woken up for visit. Will treat conservatively due to her high fall risk and age. Continue HCTZ and lisinopril.   CMP obtained today.  -History of falls/impaired mobility- Last fall Nov 2021 with ED visit- no acute injuries or fractures noted at that time. Weakness of BLE. She recently graduated from home health physical therapy. Has walker and wheelchair in home but ambulates primarily with a cane (ambulately slowly but with good balance during visit)  Advanced Medical Directives Discussion: DDNR completed with patient and daughter at previous visit. Daughter discussed option of hospice, but patient is likely not eligible at this time as she has no significant decline or qualifying diagnosis. Goal of care per patient and daughter is primarily comfort, but would like acute issues addressed if possible to do so in the home non-invasively. Follow up: in 1 month with NP (7/19/22). Will need to follow-up on cerumen, consider ear irrigation   Patient/family was provided a paper after visit summary prior to conclusion of visit. HISTORY:      CHIEF COMPLAINT:    Chief Complaint   Patient presents with    Altered mental status       HPI/SUBJECTIVE:    The patient is: [x] Verbal / [] Nonverbal     Ms. Raya Mclean is a 79 y/o F seen today for concern of altered mental status as noted by daughter to team . Daughter, Megan Alonzo, inquired SW about option for hospice evaluation. Patient continues to manage her own medications (lines up pill bottles and then checks off in notebook when she has taken). Daughter notes she misses days, and patient states she takes them \"when she gets to it. \"  Daughter asked patient to \"tell the truth\" and patient became agitated and said she is \"taking them enough. \"  She was previously working with home health PT after a fall, and she recently graduated. Daughter feels that PT improved patient's routine as she had motivation to get out of bed and presentable for these appointments. Patient is well-groomed and dressed for visit today, and Megan Renejesus states that our visit is the reason she got out of bed today.   Megan Alonzo endorses patient occasionally sleeping until 7PM and staying up until 5AM. Kate Chinchilla does not feel like she can go to sleep since patient is a high fall risk. She has recently obtained a relative's life alert and is arranging for this service for the patient. Kate Chinchilla continues to feel overwhelmed by her mother's care, and plans to call [facility] after our visit today. Patient denies any symptoms today including SOB, coughing, constipation or diarrhea, nausea, dizziness, headaches, or pain. Daughter states she is having urinary incontinence at times and wears a brief, but is also able to use toilet. MMSE completed with patient and she scored 25/30. However, she has significant difficulty hearing and several instructions had to be repeated several times. Repeated instructions verbatim and patient would often state \"notw you have me all confused,\" but would eventually answer the question appropriately. Patient endorsed eating well, but daughter states she can only guarantee one meal per day. Patient feels she is typically in a good mood, but she does become irritable with her daughter during visit. Kate Chinchilla is seeking additional resources for caregivers as she feels that her mother does not respond well to Susie's attempts to help. She responds and is very cooperative with healthcare providers such as NP and therapists. She asks about potential for hospice evaluation. Discussed with daughter that she did not have a qualifying diagnosis or evidence of decline at this time, but can reconsider if situation changes. Due to apparent fatigue and decreased activity and PO intake, labs obtained including CBC, TSH, CMP. Urinalysis obtained due to changes in mental status and urinary incontinence. Discussed with team SW after visit who discussed options previously and plans to follow-up with patient later this week. REVIEW OF SYSTEMS:     Review of Systems   Constitutional: Negative for chills, fever, malaise/fatigue and weight loss. HENT: Positive for hearing loss. Negative for congestion, ear discharge, ear pain and sore throat. Eyes: Negative for blurred vision, discharge and redness. Respiratory: Negative for cough, shortness of breath and wheezing. Cardiovascular: Negative for chest pain, palpitations and leg swelling. Gastrointestinal: Negative for abdominal pain, blood in stool, constipation, diarrhea, nausea and vomiting. Genitourinary: Positive for frequency. Negative for dysuria. Worsening incontinence   Musculoskeletal: Negative for back pain, falls and joint pain. Skin: Negative for itching and rash. Neurological: Positive for weakness. Negative for dizziness, speech change and headaches. Psychiatric/Behavioral: Negative for depression and memory loss. The patient is not nervous/anxious and does not have insomnia. PHYSICAL EXAM:     Visit Vitals  BP (!) 146/84   Pulse 70   Temp 99 °F (37.2 °C) (Temporal)   Resp 18   SpO2 96%       Physical Exam  Constitutional:       General: She is not in acute distress. Appearance: She is not toxic-appearing. Comments: Thin, well-dressed, well-groomed female   HENT:      Head: Normocephalic and atraumatic. Right Ear: External ear normal. Decreased hearing noted. Left Ear: External ear normal. Decreased hearing noted. Ears:      Comments: Very hard of hearing. Has hearing aids but does not wear     Nose: Nose normal. No rhinorrhea. Mouth/Throat:      Mouth: Mucous membranes are moist.      Pharynx: Oropharynx is clear. Comments: Wearing lower dentures only  Eyes:      General:         Right eye: No discharge. Left eye: No discharge. Extraocular Movements: Extraocular movements intact. Conjunctiva/sclera: Conjunctivae normal.      Pupils: Pupils are equal, round, and reactive to light. Neck:      Vascular: No carotid bruit. Cardiovascular:      Rate and Rhythm: Normal rate and regular rhythm. Pulses: Normal pulses.       Heart sounds: Normal heart sounds. No murmur heard. No gallop. Pulmonary:      Effort: Pulmonary effort is normal. No respiratory distress. Breath sounds: Normal breath sounds. No wheezing or rhonchi. Abdominal:      General: Bowel sounds are normal. There is no distension. Palpations: Abdomen is soft. There is no mass. Tenderness: There is no abdominal tenderness. There is no guarding. Musculoskeletal:         General: No deformity. Right lower leg: No edema. Left lower leg: No edema. Skin:     General: Skin is warm and dry. Capillary Refill: Capillary refill takes less than 2 seconds. Findings: No bruising or rash. Neurological:      General: No focal deficit present. Mental Status: She is alert and oriented to person, place, and time. Cranial Nerves: No cranial nerve deficit. Sensory: No sensory deficit. Gait: Gait normal.      Comments: Generalized weakness.  strength symmetric, facial features symmetric and speech clear. Psychiatric:         Mood and Affect: Mood normal.         Behavior: Behavior normal.         Thought Content:  Thought content normal.        HISTORY:     Past Medical and Surgical History reviewed in [facility] on date of visit    Patient Active Problem List   Diagnosis Code    Essential hypertension I10    Osteoarthritis M19.90    Major depressive disorder with single episode, in remission ([facility] [address] F32.5    Chronic heart failure with preserved ejection fraction (HCC) I50.32    At high risk for falls Z91.81    Impaired mobility Z74.09    Pulmonary hypertension (HCC) I27.20    Chronic obstructive pulmonary disease (HCC) J44.9    History of benzodiazepine use Z87.898    COVID-19 vaccine series completed Z92.29     Family History   Problem Relation Age of Onset    Heart Attack Mother     Stroke Father       Social History     Tobacco Use    Smoking status: Never Smoker    Smokeless tobacco: Never Used   Substance Use Topics    Alcohol use: Yes     Alcohol/week: 0.8 standard drinks     Types: 1 drink(s) per week     Comment: rare     No Known Allergies   Current Outpatient Medications   Medication Sig    sertraline (ZOLOFT) 100 mg tablet TAKE 1 TABLET BY MOUTH EVERY DAY    lisinopriL (PRINIVIL, ZESTRIL) 40 mg tablet TAKE 1 TABLET BY MOUTH EVERY DAY    hydroCHLOROthiazide (HYDRODIURIL) 25 mg tablet TAKE 1 TABLET BY MOUTH EVERY DAY FOR BLOOD PRESSURE    diclofenac (VOLTAREN) 1 % gel Apply  to affected area four (4) times daily.  vit A,C,E-zinc-copper (PreserVision AREDS) cap capsule Take 1 Capsule by mouth daily. No current facility-administered medications for this visit. LAB DATA REVIEWED:     Lab Results   Component Value Date/Time    Hemoglobin A1c 5.7 04/08/2015 01:40 PM     No results found for: MCACR, MCA1, MCA2, MCA3, MCAU, MCAU2, MCALPOCT  No results found for: TSH, TSH2, TSH3, TSHP, TSHEXT, TSHEXT  No results found for: SAM Alvarez      Lab Results   Component Value Date/Time    WBC 10.9 11/27/2021 12:32 AM    HGB 11.6 11/27/2021 12:32 AM    PLATELET 224 35/34/5859 12:32 AM     Lab Results   Component Value Date/Time    Sodium 135 (L) 11/27/2021 12:32 AM    Potassium 4.4 11/27/2021 12:32 AM    Chloride 102 11/27/2021 12:32 AM    CO2 26 11/27/2021 12:32 AM    BUN 20 11/27/2021 12:32 AM    Creatinine 0.83 11/27/2021 12:32 AM    Calcium 9.6 11/27/2021 12:32 AM      Lab Results   Component Value Date/Time    Alk.  phosphatase 92 11/27/2021 12:32 AM    Protein, total 8.0 11/27/2021 12:32 AM    Albumin 3.0 (L) 11/27/2021 12:32 AM    Globulin 5.0 (H) 11/27/2021 12:32 AM     No results found for: IRON, FE, TIBC, IBCT, PSAT, FERR           Helene Colbert NP

## 2022-06-16 DIAGNOSIS — N30.90 CYSTITIS: Primary | ICD-10-CM

## 2022-06-16 RX ORDER — NITROFURANTOIN 25; 75 MG/1; MG/1
100 CAPSULE ORAL 2 TIMES DAILY
Qty: 10 CAPSULE | Refills: 0 | Status: SHIPPED | OUTPATIENT
Start: 2022-06-16 | End: 2022-07-19 | Stop reason: ALTCHOICE

## 2022-06-16 NOTE — PROGRESS NOTES
Macrobid sent to pharmacy due to UA suspicious for UTI and symptoms of change in mental status and worsening incontinence. Attempted call to Ban Salinas, daughter, to notify and left voicemail.

## 2022-06-17 NOTE — PROGRESS NOTES
UA results suspicious for UTI. Macrobid sent to pharmacy for 5-day course and daughter, Kizzy Amaro called and left voicemail notifying of results and change. Will attempt to call again today. Other results not concerning.   Will follow for culture/sensitivity results

## 2022-06-19 LAB
BACTERIA SPEC CULT: ABNORMAL
BACTERIA SPEC CULT: ABNORMAL
CC UR VC: ABNORMAL
SERVICE CMNT-IMP: ABNORMAL

## 2022-06-20 NOTE — PROGRESS NOTES
Attempted to call daughter, Gilles, x 2.  Voicemail left regarding changes- Antibiotic has been sent to pharmacy and it would cover the bacteria that was in her urine.   Requested call back to ensure that message was received

## 2022-07-13 ENCOUNTER — TELEPHONE (OUTPATIENT)
Dept: FAMILY MEDICINE CLINIC | Age: 87
End: 2022-07-13

## 2022-07-13 NOTE — TELEPHONE ENCOUNTER
rec'd 4:02pm today. Kishan Fitz called to ask for a referral to [facility]. She said she thinks the patient needs ot/pt.   #[phone]

## 2022-07-14 NOTE — TELEPHONE ENCOUNTER
Telephone call to [facility] and received VM, message left that Efrain Mosley NP will discuss need for further [place] PT and OT with patient and dgt at time of her home visit with patient on 7/19.

## 2022-07-15 ENCOUNTER — TELEPHONE (OUTPATIENT)
Dept: FAMILY MEDICINE CLINIC | Age: 87
End: 2022-07-15

## 2022-07-15 ENCOUNTER — HOSPITAL ENCOUNTER (EMERGENCY)
Age: 87
Discharge: HOME | End: 2022-07-15
Attending: EMERGENCY MEDICINE
Payer: MEDICARE

## 2022-07-15 VITALS
OXYGEN SATURATION: 95 % | RESPIRATION RATE: 16 BRPM | TEMPERATURE: 98.2 F | HEART RATE: 80 BPM | DIASTOLIC BLOOD PRESSURE: 81 MMHG | SYSTOLIC BLOOD PRESSURE: 137 MMHG

## 2022-07-15 DIAGNOSIS — R53.83 FATIGUE, UNSPECIFIED TYPE: Primary | ICD-10-CM

## 2022-07-15 LAB
ALBUMIN SERPL-MCNC: 2.6 G/DL (ref 3.5–5)
ALBUMIN/GLOB SERPL: 0.6 {RATIO} (ref 1.1–2.2)
ALP SERPL-CCNC: 81 U/L (ref 45–117)
ALT SERPL-CCNC: 15 U/L (ref 12–78)
ANION GAP SERPL CALC-SCNC: 9 MMOL/L (ref 5–15)
APPEARANCE UR: CLEAR
AST SERPL-CCNC: 43 U/L (ref 15–37)
BACTERIA URNS QL MICRO: ABNORMAL /HPF
BASOPHILS # BLD: 0 K/UL (ref 0–0.1)
BASOPHILS NFR BLD: 0 % (ref 0–1)
BILIRUB SERPL-MCNC: 0.6 MG/DL (ref 0.2–1)
BILIRUB UR QL: NEGATIVE
BUN SERPL-MCNC: 26 MG/DL (ref 6–20)
BUN/CREAT SERPL: 28 (ref 12–20)
CALCIUM SERPL-MCNC: 9.2 MG/DL (ref 8.5–10.1)
CHLORIDE SERPL-SCNC: 104 MMOL/L (ref 97–108)
CO2 SERPL-SCNC: 28 MMOL/L (ref 21–32)
COLOR UR: ABNORMAL
CREAT SERPL-MCNC: 0.93 MG/DL (ref 0.55–1.02)
DIFFERENTIAL METHOD BLD: ABNORMAL
EOSINOPHIL # BLD: 0.1 K/UL (ref 0–0.4)
EOSINOPHIL NFR BLD: 1 % (ref 0–7)
EPITH CASTS URNS QL MICRO: ABNORMAL /LPF
ERYTHROCYTE [DISTWIDTH] IN BLOOD BY AUTOMATED COUNT: 12.9 % (ref 11.5–14.5)
GLOBULIN SER CALC-MCNC: 4.7 G/DL (ref 2–4)
GLUCOSE SERPL-MCNC: 113 MG/DL (ref 65–100)
GLUCOSE UR STRIP.AUTO-MCNC: NEGATIVE MG/DL
HCT VFR BLD AUTO: 32.5 % (ref 35–47)
HGB BLD-MCNC: 11.1 G/DL (ref 11.5–16)
HGB UR QL STRIP: ABNORMAL
IMM GRANULOCYTES # BLD AUTO: 0 K/UL (ref 0–0.04)
IMM GRANULOCYTES NFR BLD AUTO: 1 % (ref 0–0.5)
KETONES UR QL STRIP.AUTO: NEGATIVE MG/DL
LEUKOCYTE ESTERASE UR QL STRIP.AUTO: NEGATIVE
LYMPHOCYTES # BLD: 0.8 K/UL (ref 0.8–3.5)
LYMPHOCYTES NFR BLD: 10 % (ref 12–49)
MCH RBC QN AUTO: 32.6 PG (ref 26–34)
MCHC RBC AUTO-ENTMCNC: 34.2 G/DL (ref 30–36.5)
MCV RBC AUTO: 95.6 FL (ref 80–99)
MONOCYTES # BLD: 0.8 K/UL (ref 0–1)
MONOCYTES NFR BLD: 10 % (ref 5–13)
MUCOUS THREADS URNS QL MICRO: ABNORMAL /LPF
NEUTS SEG # BLD: 6.6 K/UL (ref 1.8–8)
NEUTS SEG NFR BLD: 78 % (ref 32–75)
NITRITE UR QL STRIP.AUTO: NEGATIVE
NRBC # BLD: 0 K/UL (ref 0–0.01)
NRBC BLD-RTO: 0 PER 100 WBC
PH UR STRIP: 5.5 [PH] (ref 5–8)
PLATELET # BLD AUTO: 321 K/UL (ref 150–400)
PMV BLD AUTO: 11.8 FL (ref 8.9–12.9)
POTASSIUM SERPL-SCNC: 3.8 MMOL/L (ref 3.5–5.1)
PROT SERPL-MCNC: 7.3 G/DL (ref 6.4–8.2)
PROT UR STRIP-MCNC: ABNORMAL MG/DL
RBC # BLD AUTO: 3.4 M/UL (ref 3.8–5.2)
RBC #/AREA URNS HPF: ABNORMAL /HPF (ref 0–5)
SODIUM SERPL-SCNC: 141 MMOL/L (ref 136–145)
SP GR UR REFRACTOMETRY: 1.02 (ref 1–1.03)
TROPONIN-HIGH SENSITIVITY: 20 NG/L (ref 0–51)
UR CULT HOLD, URHOLD: NORMAL
UROBILINOGEN UR QL STRIP.AUTO: 1 EU/DL (ref 0.2–1)
WBC # BLD AUTO: 8.4 K/UL (ref 3.6–11)
WBC URNS QL MICRO: ABNORMAL /HPF (ref 0–4)

## 2022-07-15 PROCEDURE — 85025 COMPLETE CBC W/AUTO DIFF WBC: CPT

## 2022-07-15 PROCEDURE — 36415 COLL VENOUS BLD VENIPUNCTURE: CPT

## 2022-07-15 PROCEDURE — 80053 COMPREHEN METABOLIC PANEL: CPT

## 2022-07-15 PROCEDURE — 81001 URINALYSIS AUTO W/SCOPE: CPT

## 2022-07-15 PROCEDURE — 84484 ASSAY OF TROPONIN QUANT: CPT

## 2022-07-15 PROCEDURE — 93005 ELECTROCARDIOGRAM TRACING: CPT

## 2022-07-15 PROCEDURE — 99284 EMERGENCY DEPT VISIT MOD MDM: CPT

## 2022-07-15 NOTE — TELEPHONE ENCOUNTER
Telephone call from [facility] advising that patient has not felt well most of the week and is stiff and week. Tien Schultz says she has a cold but denies pt having any respiratory symptoms. States patient stayed in bed all day yesterday and ate and drank almost nothing. She has to put her in wheelchair to get her to the bathroom and normally she can walk. Still unable to walk today due to weakness and dgt feels she is dehydrated. Advised to call [facility] to come see pt to assess. Tien Schultz called back after contacting [facility] and stated that they refused to see her and recommended that she call 911 and send to he ER. She will be sending her to [facility].

## 2022-07-15 NOTE — ED TRIAGE NOTES
Patient arrives by EMS from home with c/o generalized fatigue. Patient lives alone with daughter and has been having a hard time getting around and daughter would like assistance for nursing home placement.

## 2022-07-15 NOTE — ED PROVIDER NOTES
The history is provided by the patient. Fatigue  This is a new problem. The current episode started 2 days ago. The problem has not changed since onset. There was no focality noted. Pertinent negatives include no focal weakness, no loss of sensation, no loss of balance, no slurred speech, no speech difficulty, no memory loss, no movement disorder, no agitation, no visual change, no auditory change, no mental status change, no unresponsiveness and no disorientation. There has been no fever. Pertinent negatives include no shortness of breath, no chest pain, no vomiting, no altered mental status, no confusion, no headaches, no choking, no nausea, no bowel incontinence and no bladder incontinence.         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    Psychiatric disorder     anxiety       Past Surgical History:   Procedure Laterality Date    HX APPENDECTOMY      HX CATARACT REMOVAL Bilateral     HX HYSTERECTOMY  1968    HX MASTECTOMY Left 1956    lypmph node removal    HX MASTECTOMY Right 1994    HX ORTHOPAEDIC Left     arthoscopic knee surgery    HX ORTHOPAEDIC  1980    ankle surgery    HX ORTHOPAEDIC  4/20/15    Left unicompartmental knee replacement, Right knee injection    HX UROLOGICAL  2014/11    prolapsed bladder    NEUROLOGICAL PROCEDURE UNLISTED      back surgery from spinal stenosis         Family History:   Problem Relation Age of Onset    Heart Attack Mother     Stroke Father        Social History     Socioeconomic History    Marital status:      Spouse name: Not on file    Number of children: Not on file    Years of education: Not on file    Highest education level: Not on file   Occupational History    Not on file   Tobacco Use    Smoking status: Never Smoker    Smokeless tobacco: Never Used   Vaping Use    Vaping Use: Never used   Substance and Sexual Activity    Alcohol use: Yes     Alcohol/week: 0.8 standard drinks     Types: 1 Standard drinks or equivalent per week     Comment: rare    Drug use: No    Sexual activity: Not Currently   Other Topics Concern    Not on file   Social History Narrative    Not on file     Social Determinants of Health     Financial Resource Strain:     Difficulty of Paying Living Expenses: Not on file   Food Insecurity:     Worried About Running Out of Food in the Last Year: Not on file    Jose of Food in the Last Year: Not on file   Transportation Needs:     Lack of Transportation (Medical): Not on file    Lack of Transportation (Non-Medical): Not on file   Physical Activity:     Days of Exercise per Week: Not on file    Minutes of Exercise per Session: Not on file   Stress:     Feeling of Stress : Not on file   Social Connections:     Frequency of Communication with Friends and Family: Not on file    Frequency of Social Gatherings with Friends and Family: Not on file    Attends Cheondoism Services: Not on file    Active Member of [address] [place] [id number] or Organizations: Not on file    Attends Club or Organization Meetings: Not on file    Marital Status: Not on file   Intimate Partner Violence:     Fear of Current or Ex-Partner: Not on file    Emotionally Abused: Not on file    Physically Abused: Not on file    Sexually Abused: Not on file   Housing Stability:     Unable to Pay for Housing in the Last Year: Not on file    Number of [place] in the Last Year: Not on file    Unstable Housing in the Last Year: Not on file         ALLERGIES: Patient has no known allergies. Review of Systems   Constitutional: Negative for activity change, chills, fatigue and fever. HENT: Negative for nosebleeds, sore throat, trouble swallowing and voice change. Eyes: Negative for visual disturbance.    Respiratory: Negative for choking and shortness of breath. Cardiovascular: Negative for chest pain and palpitations. Gastrointestinal: Negative for abdominal pain, bowel incontinence, constipation, diarrhea, nausea and vomiting. Genitourinary: Negative for bladder incontinence, difficulty urinating, dysuria, hematuria and urgency. Musculoskeletal: Negative for back pain, neck pain and neck stiffness. Skin: Negative for color change. Allergic/Immunologic: Negative for immunocompromised state. Neurological: Negative for dizziness, focal weakness, seizures, syncope, speech difficulty, weakness, light-headedness, numbness, headaches and loss of balance. Psychiatric/Behavioral: Negative for agitation, behavioral problems, confusion, hallucinations, memory loss, self-injury and suicidal ideas. Vitals:    07/15/22 1501   BP: (!) 161/90   Pulse: 90   Resp: 16   Temp: 98.2 °F (36.8 °C)   SpO2: 97%            Physical Exam  Vitals and nursing note reviewed. Constitutional:       General: She is not in acute distress. Appearance: She is well-developed. She is not diaphoretic. HENT:      Head: Atraumatic. Neck:      Trachea: No tracheal deviation. Cardiovascular:      Comments: Warm and well perfused  Pulmonary:      Effort: Pulmonary effort is normal. No respiratory distress. Musculoskeletal:         General: Normal range of motion. Skin:     General: Skin is warm and dry. Neurological:      Mental Status: She is alert. Coordination: Coordination normal.   Psychiatric:         Behavior: Behavior normal.         Thought Content: Thought content normal.         Judgment: Judgment normal.          MDM     This is a 40-year-old female with past medical history, review of systems, physical exam as above, presenting via EMS for reports of generalized fatigue. Upon arrival patient awake and alert, oriented x4. She states she is unclear as to why her daughter sent her to the emergency department.   She denies complaints. Physical exam is remarkable for well-appearing elderly female, in no acute distress noted to be hypertensive, afebrile without tachycardia, satting well on room air. She has clear breath sounds auscultation, regular rate and rhythm without murmurs gallops or rubs. Plan to obtain CMP, CBC, cardiac enzymes, UA. We will reassess, and make a disposition.     Procedures

## 2022-07-15 NOTE — ED NOTES
Pt ambulated with no assistance, but slowly, to wheelchair for hospital to home. Pt given belongings which included medication bottles, insurance cards and ID that came with her in clear plastic bag. No glasses came in with pt per triage nurse.

## 2022-07-15 NOTE — PROGRESS NOTES
SSED/CM consult received and appreciated. EMR reviewed. Noted daughter requesting resources for placement. Call placed to [facility] [phone] introduced to role of CM. Daughter acknowledged [facility] unable to receive patient and was routed to [facility]. History obtained daughter acknowledges patient at times will not collaborate w/ ADL assistance. However w/ others will perform. Per daughter Ms. Nona Morris has [facility] visit on Tuesday w/ Nursing and will make decisions from there. PT has been completed. Nilda Krishna states \" I know a lady who keeps elderly patients. \" Noted progress entry from Aniaroxanne Boggs [facility] 6/13/22 regarding medicaid application hotline. CM asked if daughter had placed call states she also has medical concerns and trying to get patient affairs in order. Ba Partida informed if daughter or another family member/friend could placed call since process takes 45 days. At this time daughter would like private duty list emailed [email or web address]. This writer asked daughter to stay in close communication w/ [facility] team - SSED/CM is still available if needed. No additional needs verbalized. CM provided updates to [employer].

## 2022-07-15 NOTE — ED NOTES
Jadyn Benson, pts daughter is concerned for not having help at home. Would like resources to help at home or help with placement. Susie's Contact information: [phone] (cell), [phone] (home)    Hospital to home arranged for safe transportation back to home.

## 2022-07-16 LAB
ATRIAL RATE: 322 BPM
CALCULATED P AXIS, ECG09: 46 DEGREES
CALCULATED R AXIS, ECG10: -4 DEGREES
CALCULATED T AXIS, ECG11: -156 DEGREES
DIAGNOSIS, 93000: NORMAL
Q-T INTERVAL, ECG07: 378 MS
QRS DURATION, ECG06: 74 MS
QTC CALCULATION (BEZET), ECG08: 444 MS
VENTRICULAR RATE, ECG03: 83 BPM

## 2022-07-19 ENCOUNTER — HOME VISIT (OUTPATIENT)
Dept: FAMILY MEDICINE CLINIC | Age: 87
End: 2022-07-19
Payer: MEDICARE

## 2022-07-19 VITALS
SYSTOLIC BLOOD PRESSURE: 140 MMHG | DIASTOLIC BLOOD PRESSURE: 82 MMHG | OXYGEN SATURATION: 93 % | TEMPERATURE: 97.5 F | RESPIRATION RATE: 18 BRPM | HEART RATE: 75 BPM

## 2022-07-19 DIAGNOSIS — R53.1 WEAKNESS GENERALIZED: ICD-10-CM

## 2022-07-19 DIAGNOSIS — J44.9 CHRONIC OBSTRUCTIVE PULMONARY DISEASE, UNSPECIFIED COPD TYPE (HCC): ICD-10-CM

## 2022-07-19 DIAGNOSIS — Z74.09 IMPAIRED MOBILITY: ICD-10-CM

## 2022-07-19 DIAGNOSIS — Z91.81 AT MODERATE RISK FOR FALL: Primary | ICD-10-CM

## 2022-07-19 DIAGNOSIS — I50.32 CHRONIC HEART FAILURE WITH PRESERVED EJECTION FRACTION (HCC): ICD-10-CM

## 2022-07-19 DIAGNOSIS — F32.5 MAJOR DEPRESSIVE DISORDER WITH SINGLE EPISODE, IN REMISSION (HCC): ICD-10-CM

## 2022-07-19 DIAGNOSIS — Z73.0 BURNOUT OF CAREGIVER: ICD-10-CM

## 2022-07-19 DIAGNOSIS — H61.23 BILATERAL IMPACTED CERUMEN: ICD-10-CM

## 2022-07-19 DIAGNOSIS — M19.90 OSTEOARTHRITIS, UNSPECIFIED OSTEOARTHRITIS TYPE, UNSPECIFIED SITE: ICD-10-CM

## 2022-07-19 DIAGNOSIS — I10 ESSENTIAL HYPERTENSION: ICD-10-CM

## 2022-07-19 PROCEDURE — 1123F ACP DISCUSS/DSCN MKR DOCD: CPT | Performed by: NURSE PRACTITIONER

## 2022-07-19 PROCEDURE — 99350 HOME/RES VST EST HIGH MDM 60: CPT | Performed by: NURSE PRACTITIONER

## 2022-07-19 NOTE — PROGRESS NOTES
Home Based [address] [facility]   [id number]         NOTE: Home Based Primary Care is a PROVIDER (MD/NP) based interdisciplinary practice (RN, LCSW, Pharmacy, Dietician) for patients who cannot access (or have a taxing effort) primary / specialty medical care in an office setting. [facility] is NOT [place] but works with [facility] when there is a skilled need. Our MD/NPs are integral in Care Transitions; PLEASE CALL [phone] this patient arrives in the Emergency Department or Hospital.           Date of Current Visit: 7/19/2022      SUMMARY:   Janet Mcclellan is a 80 y.o. F, established patient of [facility] seen today in the home due to taxing effort to seek primary care services in the community s/t impaired mobility, arthritis, fall risk. ASSESSMENT AND PLAN       ICD-10-CM ICD-9-CM    1. At moderate risk for fall  Z91.81 V15.88 REFERRAL TO PHYSICAL THERAPY   2. Impaired mobility  Z74.09 799.89    3. Weakness generalized  R53.1 780.79    4. Osteoarthritis, unspecified osteoarthritis type, unspecified site  M19.90 715.90    5. Chronic heart failure with preserved ejection fraction (HCC)  I50.32 428.9    6. Chronic obstructive pulmonary disease, unspecified COPD [facility]  J44.9 496    7. Essential hypertension  I10 401.9    8. Major depressive disorder with single episode, in remission ([facility]  F32.5 296.25    9. Bilateral impacted cerumen  H61.23 380.4    10. Burnout of caregiver  Z73.0 V69.8      -Fall risk/impaired mobility- Last fall Nov 2021 with ED visit- no acute injuries or fractures at that time. Has walker and wheelchair in home but ambulates primarily with a cane. Daughter/caregiver Roderick Jurado endorses worsening function and requiring more assistance for ambulation. She has been more resistant to bathing, restroom assistance, dressing.   Due to functional decline and decreased participation in ADL's, will refer to PT/OT at this time.   -Weakness of extremities: Decline in function, patient not getting out of bed as often as previously. ED visit 7/15/22 for weakness and no causes identified (CBC, CMP, cardiac enzymes, UA normal). Decreased participation in ADLs and requiring increased assistance. Patient reports weakness and \"stiffness\" today that makes her not want to get out of bed. Will refer to PT/OT with [facility] (daughter preference). 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 Reports intermittent pain  5/10 in lower back that is her baseline. Reports pain and worsening stiffness are making her not want to get out of bed. She has been taking Tylenol rarely. Discussed scheduling (650mg BID) so she will have more lasting relief. Encouraged use of Voltaren gel PRN (is in home, discussed directions with daughter). She is taking Canna-LS, CBD supplements. -HFpEF:  Stable, no recent exacerbations. No available Echo in chart review. Appears euvolemic on exam today. Continue HCTZ and lisinopril. -COPD: No medications at this time. No recent exacerbations or symptoms, breathing comfortably on exam today with lungs CTAB. -HTN: Goal <150/90- slightly above goal at start of visit at 152/86, improves to 144/84 by end of visit. Patient has not taken BP medications yet (takes during visit). Will treat conservatively due to her high fall risk and age. Continue HCTZ and lisinopril.      -Depression: Reports mood is stable. Daughter reports she has decreased motivation and worsening irritability. Continue sertraline 100mg daily. She declines any changes to medications at this time.    -Cerumen impaction: Significant cerumen buildup bilaterally, unable to visualize TM. Due to chronic nature of hearing loss, suspect sensorineural rather than conductive hearing loss, but certainly this could contribute.   Patient started Debrox drops in May, ear lavage completed today with visualization of TM after lavage. Patient tolerated well.   -Caregiver burnout: Daughter, Pedro Baldwin, is primary caretaker but has health concerns of her own. Patient has had increased care needs and is not participating (is possibly unable or is resistant to care from daughter). Team SW has been in contact with Pedro Baldwin for options and to provide support. Pedro Baldwin has a friend Gilberto Al (manager of [facility] home) who has expressed patient can live with her. Discussed that this is a good option due to both of their increased needs. Discussed with patient today who is agreeable, but patient is possibly less agreeable to moving when discussing with daughter. Will continue to follow with daughter regarding this plan. **PT/OT Referral: Patient has had worsening weakness and stiffness. Notable functional decline with decreased participation in ADLs. ED Visit 7/15/22 for worsening weakness and no acute medical causes identified. Patient would benefit from PT for gait training due to fall risk and functional decline. She would benefit from OT for ADL training and assessment for equipment needs. AMD: Daughter, Pedro Baldwin, states that she has completed AMD with a , verified that it includes medical wishes and is not just financial. Unable to locate copy today, copy requested. Code status: DNR- DDNR in home  Labs:  CMP, CBC, TSH obtained May 2022 and stable, will repeat q 6-12 months or as needed  Follow up: in 6 weeks with NP (8/31/22)     Patient/family was provided a paper after visit summary prior to conclusion of visit. HISTORY:      CHIEF COMPLAINT:    Chief Complaint   Patient presents with    Follow-up    ED Follow-up    Extremity Weakness       HPI/SUBJECTIVE:    The patient is: [x] Verbal / [] Nonverbal     Ms. Heather Hilton is a 79 y/o F seen today for follow-up visit of chronic medical conditions. She lives at home with her daughter, Pedro Baldwin.   She is ambulating in the home mostly with a cane, and has had no falls since November 2021. At previous visit, she was diagnosed with a UTI and has since completed a course of antibiotics. She was seen in [facility] ED on 7/15/22 for worsening weakness. Patient was not able to transfer herself and daughter had difficulty assisting her with transfer to bathroom. In the ED, CBC, CMP, and cardiac enzymes, and UA were negative for contributing causes. Per review of ED notes, patient denied any concerns and stated she did not know why she was there. Daughter Na Munoz requests overall difficulty in managing her mother. She is unable to manage the household, cook, and assist with all of her mother's care needs at this time. Patient has become increasingly resistant to help with dressing, bathing, transfers when offered by her daughter. She has been receptive and cooperative to other individuals including therapists, nurses, providers. Na Munoz reports plan to discuss placement in group home with friend Shubham Girard (known to this practice). Discussed with patient today this option and patient states \"if that is what I need to do I would go. \"  She is typically agreeable to plans with provider, Na Munoz is worried patient will not be as agreeable when it is just them. At visit today, patient is sleeping in bed. She awakens for visit and answers questions appropriately. She endorses some trouble getting around due to stiffness\" but states she is still able to meet her needs. She reports pain 5/10 \"all over\" but worst in her back. She has not taken any medications today including Tylenol (daughter Na Munoz gives to her during visit). Her appetite has not changed significantly, is eating two meals per day and snacking. Ms. Shiela Garza reports stable mood, but daughter Na Munoz reports irritability. She is taking Zoloft 100mg for depression and declines changes these medications at this time. Her other chronic medical conditions include hypertension, COPD, and heart failure.   Her blood pressure is at goal today, and she is managed on  lisinopril and HCTZ. Her COPD is well-controlled without any current medications. She appears euvolemic on exam today without diuretics. Ms. Winston is very hard of hearing. Per daughter, she has hearing aids in the home but patient does not wear them. Ears examined to reveal large amount of cerumen in canal, unable to visualize tympanic membrane bilaterally. Discussed using Debrox in bilateral ears in May and they have initiated. Ear lavage performed today with removal of significant ear wax, subsequent visualization of TM. Patient tolerated well, no improvement in hearing. Rosendo Cardoza has been in contact with team Glenda Shafer, regarding care options. She reports she plans to speak with Alee this week and will follow-up on the plan. REVIEW OF SYSTEMS:     Review of Systems   Constitutional: Negative for chills, fever, malaise/fatigue and weight loss. HENT: Positive for hearing loss. Negative for congestion, ear discharge, ear pain and sore throat. Eyes: Negative for blurred vision, discharge and redness. Respiratory: Negative for cough, shortness of breath and wheezing. Cardiovascular: Negative for chest pain, palpitations and leg swelling. Gastrointestinal: Negative for abdominal pain, blood in stool, constipation, diarrhea, nausea and vomiting. Genitourinary: Negative for dysuria and frequency. Musculoskeletal: Positive for back pain. Negative for falls. Skin: Negative for itching and rash. Neurological: Positive for weakness. Negative for dizziness, speech change and headaches. Psychiatric/Behavioral: Negative for depression and memory loss. The patient is not nervous/anxious. PHYSICAL EXAM:     Visit Vitals  BP (!) 140/82   Pulse 75   Temp 97.5 °F (36.4 °C) (Temporal)   Resp 18   SpO2 93%       Physical Exam  Constitutional:       General: She is not in acute distress. Appearance: She is not toxic-appearing. Comments: Thin, well-dressed, well-groomed female   HENT:      Head: Normocephalic and atraumatic. Right Ear: External ear normal. Decreased hearing noted. Left Ear: External ear normal. Decreased hearing noted. Ears:      Comments: Very hard of hearing. Ear lavage performed today, TM intact bilaterally     Nose: Nose normal. No rhinorrhea. Mouth/Throat:      Mouth: Mucous membranes are moist.      Pharynx: Oropharynx is clear. Comments: Has dentures but is not wearing at this time  Eyes:      General:         Right eye: No discharge. Left eye: No discharge. Extraocular Movements: Extraocular movements intact. Conjunctiva/sclera: Conjunctivae normal.      Pupils: Pupils are equal, round, and reactive to light. Neck:      Vascular: No carotid bruit. Cardiovascular:      Rate and Rhythm: Normal rate and regular rhythm. Pulses: Normal pulses. Heart sounds: Normal heart sounds. No murmur heard. No gallop. Pulmonary:      Effort: Pulmonary effort is normal. No respiratory distress. Breath sounds: Normal breath sounds. No wheezing or rhonchi. Abdominal:      General: Bowel sounds are normal. There is no distension. Palpations: Abdomen is soft. There is no mass. Tenderness: There is no abdominal tenderness. There is no guarding. Musculoskeletal:         General: No deformity. Right lower leg: No edema. Left lower leg: No edema. Skin:     General: Skin is warm and dry. Capillary Refill: Capillary refill takes less than 2 seconds. Findings: No bruising or rash. Neurological:      General: No focal deficit present. Mental Status: She is alert and oriented to person, place, and time. Comments: Generalized weakness that has worsened recently   Psychiatric:         Mood and Affect: Mood normal.         Behavior: Behavior normal.         Thought Content:  Thought content normal.        HISTORY:     Past Medical and Surgical History reviewed in [facility] on date of visit    Patient Active Problem List   Diagnosis Code    Essential hypertension I10    Osteoarthritis M19.90    Major depressive disorder with single episode, in remission ([facility] F32.5    Chronic heart failure with preserved ejection fraction (HCC) I50.32    At high risk for falls Z91.81    Impaired mobility Z74.09    Pulmonary hypertension (HCC) I27.20    Chronic obstructive pulmonary disease (HCC) J44.9    History of benzodiazepine use Z87.898    COVID-19 vaccine series completed Z92.29    At moderate risk for fall Z91.81     Family History   Problem Relation Age of Onset    Heart Attack Mother     Stroke Father       Social History     Tobacco Use    Smoking status: Never Smoker    Smokeless tobacco: Never Used   Substance Use Topics    Alcohol use: Yes     Alcohol/week: 0.8 standard drinks     Types: 1 Standard drinks or equivalent per week     Comment: rare     No Known Allergies   Current Outpatient Medications   Medication Sig    sertraline (ZOLOFT) 100 mg tablet TAKE 1 TABLET BY MOUTH EVERY DAY    lisinopriL (PRINIVIL, ZESTRIL) 40 mg tablet TAKE 1 TABLET BY MOUTH EVERY DAY    hydroCHLOROthiazide (HYDRODIURIL) 25 mg tablet TAKE 1 TABLET BY MOUTH EVERY DAY FOR BLOOD PRESSURE    diclofenac (VOLTAREN) 1 % gel Apply  to affected area four (4) times daily.  vit A,C,E-zinc-copper (PreserVision AREDS) cap capsule Take 1 Capsule by mouth daily. No current facility-administered medications for this visit.         LAB DATA REVIEWED:     Lab Results   Component Value Date/Time    Hemoglobin A1c 5.7 04/08/2015 01:40 PM     No results found for: MCACR, MCA1, MCA2, MCA3, MCAU, MCAU2, MCALPOCT  Lab Results   Component Value Date/Time    TSH 3.22 06/15/2022 03:43 PM     No results found for: SAM Bell      Lab Results   Component Value Date/Time    WBC 8.4 07/15/2022 03:15 PM    HGB 11.1 (L) 07/15/2022 03:15 PM PLATELET 270 14/59/8977 03:15 PM     Lab Results   Component Value Date/Time    Sodium 141 07/15/2022 03:15 PM    Potassium 3.8 07/15/2022 03:15 PM    Chloride 104 07/15/2022 03:15 PM    CO2 28 07/15/2022 03:15 PM    BUN 26 (H) 07/15/2022 03:15 PM    Creatinine 0.93 07/15/2022 03:15 PM    Calcium 9.2 07/15/2022 03:15 PM      Lab Results   Component Value Date/Time    Alk. phosphatase 81 07/15/2022 03:15 PM    Protein, total 7.3 07/15/2022 03:15 PM    Albumin 2.6 (L) 07/15/2022 03:15 PM    Globulin 4.7 (H) 07/15/2022 03:15 PM     No results found for: IRON, FE, TIBC, IBCT, PSAT, FERR           On this date 7/19/2022  I have spent 60 minutes reviewing previous notes, test results, and face to face with the patient and daughter discussing the diagnosis, medication counseling and importance of compliance, and health care goals and options as well as documenting on the day of the visit.     Philly Youngblood NP

## 2022-07-19 NOTE — PATIENT INSTRUCTIONS
Fatigue: Care Instructions  Your Care Instructions     Fatigue is a feeling of tiredness, exhaustion, or lack of energy. You may feel fatigue because of too much or not enough activity. It can also come from stress, lack of sleep, boredom, and poor diet. Many medical problems, such as viral infections, can cause fatigue. Emotional problems, especially depression, are often the cause of fatigue. Fatigue is most often a symptom of another problem. Treatment for fatigue depends on the cause. For example, if you have fatigue because you have a certain health problem, treating this problem also treats your fatigue. If depression or anxiety is the cause, treatment may help. Follow-up care is a key part of your treatment and safety. Be sure to make and go to all appointments, and call your doctor if you are having problems. It's also a good idea to know your test results and keep a list of the medicines you take. How can you care for yourself at home? · Get regular exercise. But don't overdo it. Go back and forth between rest and exercise. · Get plenty of rest.  · Eat a healthy diet. Do not skip meals, especially breakfast.  · Reduce your use of caffeine, tobacco, and alcohol. Caffeine is most often found in coffee, tea, cola drinks, and chocolate. · Limit medicines that can cause fatigue. This includes tranquilizers and cold and allergy medicines. When should you call for help? Watch closely for changes in your health, and be sure to contact your doctor if:    · You have new symptoms such as fever or a rash.     · Your fatigue gets worse.     · You have been feeling down, depressed, or hopeless. Or you may have lost interest in things that you usually enjoy.     · You are not getting better as expected. Where can you learn more? Go to http://www.gray.com/  Enter [id number] in the search box to learn more about \"Fatigue: Care Instructions. \"  Current as of: July 1, 2021               Content Version: 13.2  © 4707-8288 [employer]. Care instructions adapted under license by [employer] (which disclaims liability or warranty for this information). If you have questions about a medical condition or this instruction, always ask your healthcare professional. [address] any warranty or liability for your use of this information.

## 2022-07-25 ENCOUNTER — DOCUMENTATION ONLY (OUTPATIENT)
Dept: FAMILY MEDICINE CLINIC | Age: 87
End: 2022-07-25

## 2022-07-25 NOTE — PROGRESS NOTES
Home Based Primary Care  Plan of Care    [facility] Team Members: Paradise Arnold MD; Cristobal Melo NP; CRISTIAN Ingram; Carlos Ryan, JEANNIE; Kennedy Griffin, JEANNIE; Felisa Palmer RN; Shoshana Ng LCSW    José Barrientosa  10/17/1922 / [id number]  female    The physician has reviewed and discussed the plan of care with the interdisciplinary group on 07/26/22 and agrees. Date of Initial Visit (Start of Care): 11/23/21    Diagnosis:   Patient Active Problem List   Diagnosis Code    Essential hypertension I10    Osteoarthritis M19.90    Major depressive disorder with single episode, in remission ([address] F32.5    Chronic heart failure with preserved ejection fraction (HCC) I50.32    At high risk for falls Z91.81    Impaired mobility Z74.09    Pulmonary hypertension (HCC) I27.20    Chronic obstructive pulmonary disease ([address] J44.9    History of benzodiazepine use Z87.898    COVID-19 vaccine series completed Z92.29    At moderate risk for fall Z91.81       Patient status summary: 80 y.o. female who was referred to the [facility] program due to impaired mobility with history of falls. Patient discussed today for POC review.     Advance Care Planning:  Code status: DNR      Primary Decision Maker (Active): Jaredbennett Claudia Child - [phone]   Advance Care Planning 11/19/2021   Patient's Healthcare Decision Maker is: Legal Next of Kin   Primary Decision Maker Name -   Primary Decision Maker Phone Number -   Primary Decision Maker Relationship to Patient -   Confirm Advance Directive Yes, not on file       DME/Supplies:  Other Cane     No Known Allergies    Nutritional Requirements:   Oral with supported meal preparation    Functional/Activity Level:  Ambulatory with assistance of cane, walker, or support of another person (significant impairment)    Safety Measures:   Fall risk    Acuity Level Rating: Medium    Current Outpatient Medications   Medication Sig    sertraline (ZOLOFT) 100 mg tablet TAKE 1 TABLET BY MOUTH EVERY DAY lisinopriL (PRINIVIL, ZESTRIL) 40 mg tablet TAKE 1 TABLET BY MOUTH EVERY DAY    hydroCHLOROthiazide (HYDRODIURIL) 25 mg tablet TAKE 1 TABLET BY MOUTH EVERY DAY FOR BLOOD PRESSURE    diclofenac (VOLTAREN) 1 % gel Apply  to affected area four (4) times daily. vit A,C,E-zinc-copper (PreserVision AREDS) cap capsule Take 1 Capsule by mouth daily. No current facility-administered medications for this visit. The Plan of Care has been initiated for during discussion at interdisciplinary group meeting  and reviewed and updated by the Interdisciplinary Group (IDG) as frequently as the patient condition warrants. Plan of Care by Discipline:    1. Provider  Ongoing evaluation of treatment interventions for specific disease state, Determine need for laboratory assessment based on patient disease status , Create and implement disease /symptom specific plan to manage high risk conditions / symptoms, Enhance visit frequency to monitor high risk health care needs and Initiate conversation regarding health care wishes     2. Nursing  Coordination of care with specialty services, Education regarding disease state, Education for safety; falls and Education for skin care in patients with limited mobility; with focus on preventing skin breakdown    3. Social Work  Assess safety concerns and address as appropriate, Assess for caregiver burden and intervene as psychosocially indicated, Assess patient for caregiver needs to optimize psychosocial function and safety, Provide information on available community resources and Assess current Advance Care Planning documents and make ACP recommendations as appropriate      Plan of Care Orders / Action Items:  -Fall risk/impaired mobility- Last fall Nov 2021 with ED visit- no acute injuries or fractures at that time. Has walker and wheelchair in home but ambulates primarily with a cane.   Daughter/caregiver Good Aquino endorses worsening function and requiring more assistance for ambulation. She has been more resistant to bathing, restroom assistance, dressing. Due to functional decline and decreased participation in ADL's, will refer to PT/OT at this time.  -Weakness of extremities: Decline in function, patient not getting out of bed as often as previously. ED visit 7/15/22 for weakness and no causes identified (CBC, CMP, cardiac enzymes, UA normal). Decreased participation in ADLs and requiring increased assistance. Patient reports weakness and \"stiffness\" during visit (7/19/22), that makes her not want to get out of bed. Will refer to PT/OT with [facility] (daughter preference). 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 Reports intermittent pain  5/10 in lower back that is her baseline. Reports pain and worsening stiffness are making her not want to get out of bed. She has been taking Tylenol rarely. Discussed scheduling (650mg BID) so she will have more lasting relief. Encouraged use of Voltaren gel PRN (is in home, discussed directions with daughter). She is taking Canna-LS, CBD supplements. -HFpEF:  Stable, no recent exacerbations. No available Echo in chart review. Appears euvolemic on 7/19/22 exam.  Continue HCTZ and lisinopril. -COPD: No medications at this time. No recent exacerbations or symptoms, breathing comfortably on 7/19/22 exam with lungs CTAB. -HTN: Goal <150/90- slightly above goal at start of visit at 152/86, improves to 144/84 by end of visit. Patient has not taken BP medications yet (takes during visit). Will treat conservatively due to her high fall risk and age. Continue HCTZ and lisinopril.      -Depression: Reports mood is stable. Daughter reports she has decreased motivation and worsening irritability. Continue sertraline 100mg daily.  She declines any changes to medications at this time.    -Cerumen impaction: Significant cerumen buildup bilaterally, unable to visualize TM. Due to chronic nature of hearing loss, suspect sensorineural rather than conductive hearing loss, but certainly this could contribute. Patient started Debrox drops in May, ear lavage completed during the visit (7/19/22), with visualization of TM after lavage. Patient tolerated well.   -Caregiver burnout: Daughter, Johnny Barker, is primary caretaker but has health concerns of her own. Patient has had increased care needs and is not participating (is possibly unable or is resistant to care from daughter). Team SW has been in contact with Johnny Barker for options and to provide support. Johnny Barker has a friend Wendy Schroeder (manager of group home) who has expressed patient can live with her. Discussed that this is a good option due to both of their increased needs. Discussed with patient (7/19/22) who is agreeable, but patient is possibly less agreeable to moving when discussing with daughter. Will continue to follow with daughter regarding this plan. **PT/OT Referral: Patient has had worsening weakness and stiffness. Notable functional decline with decreased participation in ADLs. ED Visit 7/15/22 for worsening weakness and no acute medical causes identified. Patient would benefit from PT for gait training due to fall risk and functional decline. She would benefit from OT for ADL training and assessment for equipment needs. AMD: Daughter, Johnny Barker, states that she has completed AMD with a , verified that it includes medical wishes and is not just financial. Unable to locate copy today, copy requested. Code status: DNR- DDNR in home  Labs:  CMP, CBC, TSH obtained May 2022 and stable, will repeat q 6-12 months or as needed  Follow up: in 6 weeks with NP (8/31/22)           **Update: patient has moved into a group [place] [facility]. **          Health Maintenance   Topic Date Due    DTaP/Tdap/Td series (1 - Tdap) Never done    Shingrix Vaccine Age 50> (1 of 2) Never done COVID-19 Vaccine (4 - Booster for Moderna series) 03/02/2022    Bone Densitometry (Dexa) Screening  01/19/2023 (Originally 10/17/1987)    Flu Vaccine (1) 09/01/2022    Medicare Yearly Exam  01/20/2023    Depression Monitoring  07/19/2023    Pneumococcal 65+ years  Completed         Estimated Visit Frequency:  Every 2 month Provider Visit   Last NP visit: 7/19/22  SW visits PRN

## 2022-07-26 ENCOUNTER — TELEPHONE (OUTPATIENT)
Dept: FAMILY MEDICINE CLINIC | Age: 87
End: 2022-07-26

## 2022-07-26 NOTE — TELEPHONE ENCOUNTER
Telephone call from [facility] to advise that patient has moved to [address] [place] [address] and was admitted to services of [facility] on last Friday 7/22/2022. Yefri Revels that we will close patient to [facility] and if we can assist her in the future then please reach out. She expressed appreciation for care and services provided by \[facility]\"".

## 2023-05-20 RX ORDER — VIT A/VIT C/VIT E/ZINC/COPPER 4296-226
1 CAPSULE ORAL DAILY
COMMUNITY

## 2023-05-20 RX ORDER — HYDROCHLOROTHIAZIDE 25 MG/1
TABLET ORAL
COMMUNITY
Start: 2022-06-07

## 2023-05-20 RX ORDER — LISINOPRIL 40 MG/1
1 TABLET ORAL DAILY
COMMUNITY
Start: 2022-06-07

## 2023-05-20 RX ORDER — SERTRALINE HYDROCHLORIDE 100 MG/1
1 TABLET, FILM COATED ORAL DAILY
COMMUNITY
Start: 2022-06-07